# Patient Record
Sex: MALE | Race: WHITE
[De-identification: names, ages, dates, MRNs, and addresses within clinical notes are randomized per-mention and may not be internally consistent; named-entity substitution may affect disease eponyms.]

---

## 2021-05-20 ENCOUNTER — HOSPITAL ENCOUNTER (INPATIENT)
Dept: HOSPITAL 83 - ED | Age: 36
LOS: 1 days | Discharge: NURSING, REHAB OR LONG-TERM CARE | DRG: 720 | End: 2021-05-21
Attending: INTERNAL MEDICINE | Admitting: INTERNAL MEDICINE
Payer: COMMERCIAL

## 2021-05-20 VITALS — SYSTOLIC BLOOD PRESSURE: 115 MMHG | DIASTOLIC BLOOD PRESSURE: 71 MMHG

## 2021-05-20 VITALS — DIASTOLIC BLOOD PRESSURE: 74 MMHG

## 2021-05-20 VITALS — DIASTOLIC BLOOD PRESSURE: 68 MMHG

## 2021-05-20 VITALS — BODY MASS INDEX: 25.71 KG/M2 | WEIGHT: 194 LBS | HEIGHT: 72.99 IN

## 2021-05-20 DIAGNOSIS — F10.10: ICD-10-CM

## 2021-05-20 DIAGNOSIS — M10.9: ICD-10-CM

## 2021-05-20 DIAGNOSIS — F14.10: ICD-10-CM

## 2021-05-20 DIAGNOSIS — Z71.6: ICD-10-CM

## 2021-05-20 DIAGNOSIS — Z79.899: ICD-10-CM

## 2021-05-20 DIAGNOSIS — L03.116: ICD-10-CM

## 2021-05-20 DIAGNOSIS — Z82.3: ICD-10-CM

## 2021-05-20 DIAGNOSIS — A41.9: Primary | ICD-10-CM

## 2021-05-20 DIAGNOSIS — R73.9: ICD-10-CM

## 2021-05-20 DIAGNOSIS — F17.210: ICD-10-CM

## 2021-05-20 DIAGNOSIS — E44.0: ICD-10-CM

## 2021-05-20 LAB
ALBUMIN SERPL-MCNC: 3.3 GM/DL (ref 3.1–4.5)
ALP SERPL-CCNC: 76 U/L (ref 45–117)
ALT SERPL W P-5'-P-CCNC: 24 U/L (ref 12–78)
AMPHETAMINES UR QL SCN: < 1000
AST SERPL-CCNC: 10 IU/L (ref 3–35)
BARBITURATES UR QL SCN: < 200
BASOPHILS # BLD AUTO: 0.1 10*3/UL (ref 0–0.1)
BASOPHILS NFR BLD AUTO: 0.5 % (ref 0–1)
BENZODIAZ UR QL SCN: < 200
BUN SERPL-MCNC: 13 MG/DL (ref 7–24)
BZE UR QL SCN: > 300
CANNABINOIDS UR QL SCN: < 50
CHLORIDE SERPL-SCNC: 99 MMOL/L (ref 98–107)
CREAT SERPL-MCNC: 1.3 MG/DL (ref 0.7–1.3)
EOSINOPHIL # BLD AUTO: 1.5 10*3/UL (ref 0–0.4)
EOSINOPHIL # BLD AUTO: 12.4 % (ref 1–4)
ERYTHROCYTE [DISTWIDTH] IN BLOOD BY AUTOMATED COUNT: 12 % (ref 0–14.5)
HCT VFR BLD AUTO: 41.4 % (ref 42–52)
LYMPHOCYTES # BLD AUTO: 3 10*3/UL (ref 1.3–4.4)
LYMPHOCYTES NFR BLD AUTO: 24.2 % (ref 27–41)
MCH RBC QN AUTO: 31.7 PG (ref 27–31)
MCHC RBC AUTO-ENTMCNC: 34.3 G/DL (ref 33–37)
MCV RBC AUTO: 92.4 FL (ref 80–94)
METHADONE UR QL SCN: < 300
MONOCYTES # BLD AUTO: 1.1 10*3/UL (ref 0.1–1)
MONOCYTES NFR BLD MANUAL: 8.8 % (ref 3–9)
NEUT #: 6.7 10*3/UL (ref 2.3–7.9)
NEUT %: 53.9 % (ref 47–73)
NRBC BLD QL AUTO: 0 % (ref 0–0)
OPIATES UR QL SCN: > 300
PCP UR QL SCN: <  25
PLATELET # BLD AUTO: 195 10*3/UL (ref 130–400)
PMV BLD AUTO: 9.7 FL (ref 9.6–12.3)
POTASSIUM SERPL-SCNC: 3.6 MMOL/L (ref 3.5–5.1)
PROT SERPL-MCNC: 7.1 GM/DL (ref 6.4–8.2)
RBC # BLD AUTO: 4.48 10*6/UL (ref 4.5–5.9)
SODIUM SERPL-SCNC: 135 MMOL/L (ref 136–145)
URATE SERPL-MCNC: 6.1 MG/DL (ref 3.5–7.2)
WBC NRBC COR # BLD AUTO: 12.4 10*3/UL (ref 4.8–10.8)

## 2021-05-21 VITALS — DIASTOLIC BLOOD PRESSURE: 77 MMHG

## 2021-05-21 LAB
BASOPHILS # BLD AUTO: 0 10*3/UL (ref 0–0.1)
BASOPHILS NFR BLD AUTO: 0.4 % (ref 0–1)
BUN SERPL-MCNC: 13 MG/DL (ref 7–24)
CHLORIDE SERPL-SCNC: 105 MMOL/L (ref 98–107)
CREAT SERPL-MCNC: 1.12 MG/DL (ref 0.7–1.3)
EOSINOPHIL # BLD AUTO: 1.5 10*3/UL (ref 0–0.4)
EOSINOPHIL # BLD AUTO: 16.6 % (ref 1–4)
ERYTHROCYTE [DISTWIDTH] IN BLOOD BY AUTOMATED COUNT: 11.9 % (ref 0–14.5)
HCT VFR BLD AUTO: 40.3 % (ref 42–52)
LYMPHOCYTES # BLD AUTO: 2.6 10*3/UL (ref 1.3–4.4)
LYMPHOCYTES NFR BLD AUTO: 29.4 % (ref 27–41)
MCH RBC QN AUTO: 31.5 PG (ref 27–31)
MCHC RBC AUTO-ENTMCNC: 34 G/DL (ref 33–37)
MCV RBC AUTO: 92.6 FL (ref 80–94)
MONOCYTES # BLD AUTO: 0.8 10*3/UL (ref 0.1–1)
MONOCYTES NFR BLD MANUAL: 9.3 % (ref 3–9)
NEUT #: 3.9 10*3/UL (ref 2.3–7.9)
NEUT %: 44.1 % (ref 47–73)
NRBC BLD QL AUTO: 0 % (ref 0–0)
PLATELET # BLD AUTO: 192 10*3/UL (ref 130–400)
PMV BLD AUTO: 10.1 FL (ref 9.6–12.3)
POTASSIUM SERPL-SCNC: 4 MMOL/L (ref 3.5–5.1)
RBC # BLD AUTO: 4.35 10*6/UL (ref 4.5–5.9)
SODIUM SERPL-SCNC: 139 MMOL/L (ref 136–145)
WBC NRBC COR # BLD AUTO: 9 10*3/UL (ref 4.8–10.8)

## 2021-12-01 ENCOUNTER — PREP FOR PROCEDURE (OUTPATIENT)
Dept: SURGERY | Age: 36
End: 2021-12-01

## 2021-12-01 RX ORDER — SODIUM CHLORIDE 0.9 % (FLUSH) 0.9 %
5-40 SYRINGE (ML) INJECTION EVERY 12 HOURS SCHEDULED
Status: CANCELLED | OUTPATIENT
Start: 2021-12-01

## 2021-12-01 RX ORDER — SODIUM CHLORIDE 0.9 % (FLUSH) 0.9 %
5-40 SYRINGE (ML) INJECTION PRN
Status: CANCELLED | OUTPATIENT
Start: 2021-12-01

## 2021-12-01 RX ORDER — SODIUM CHLORIDE, SODIUM LACTATE, POTASSIUM CHLORIDE, CALCIUM CHLORIDE 600; 310; 30; 20 MG/100ML; MG/100ML; MG/100ML; MG/100ML
INJECTION, SOLUTION INTRAVENOUS CONTINUOUS
Status: CANCELLED | OUTPATIENT
Start: 2021-12-01

## 2022-01-10 ENCOUNTER — PREP FOR PROCEDURE (OUTPATIENT)
Dept: SURGERY | Age: 37
End: 2022-01-10

## 2022-01-10 RX ORDER — SODIUM CHLORIDE 0.9 % (FLUSH) 0.9 %
5-40 SYRINGE (ML) INJECTION EVERY 12 HOURS SCHEDULED
Status: CANCELLED | OUTPATIENT
Start: 2022-01-10

## 2022-01-10 RX ORDER — SODIUM CHLORIDE 0.9 % (FLUSH) 0.9 %
5-40 SYRINGE (ML) INJECTION PRN
Status: CANCELLED | OUTPATIENT
Start: 2022-01-10

## 2022-01-10 RX ORDER — SODIUM CHLORIDE, SODIUM LACTATE, POTASSIUM CHLORIDE, CALCIUM CHLORIDE 600; 310; 30; 20 MG/100ML; MG/100ML; MG/100ML; MG/100ML
INJECTION, SOLUTION INTRAVENOUS CONTINUOUS
Status: CANCELLED | OUTPATIENT
Start: 2022-01-10

## 2022-02-24 ENCOUNTER — PREP FOR PROCEDURE (OUTPATIENT)
Dept: SURGERY | Age: 37
End: 2022-02-24

## 2022-02-24 RX ORDER — SODIUM CHLORIDE 0.9 % (FLUSH) 0.9 %
5-40 SYRINGE (ML) INJECTION EVERY 12 HOURS SCHEDULED
Status: CANCELLED | OUTPATIENT
Start: 2022-02-24

## 2022-02-24 RX ORDER — SODIUM CHLORIDE, SODIUM LACTATE, POTASSIUM CHLORIDE, CALCIUM CHLORIDE 600; 310; 30; 20 MG/100ML; MG/100ML; MG/100ML; MG/100ML
INJECTION, SOLUTION INTRAVENOUS CONTINUOUS
Status: CANCELLED | OUTPATIENT
Start: 2022-02-24

## 2022-02-24 RX ORDER — SODIUM CHLORIDE 0.9 % (FLUSH) 0.9 %
5-40 SYRINGE (ML) INJECTION PRN
Status: CANCELLED | OUTPATIENT
Start: 2022-02-24

## 2022-03-15 RX ORDER — ALLOPURINOL 100 MG/1
100 TABLET ORAL NIGHTLY
COMMUNITY

## 2022-03-15 NOTE — PROGRESS NOTES
[facility] PRE-ADMISSION TESTING INSTRUCTIONS    The Preadmission Testing patient is instructed accordingly using the following criteria (check applicable):    ARRIVAL INSTRUCTIONS:  [x] Parking the day of Surgery is located in the Main Entrance lot. Upon entering the door, make an immediate right to the surgery reception desk    [x] Bring photo ID and insurance card    [] Bring in a copy of Living will or Durable Power of  papers. [x] Please be sure to arrange for responsible adult to provide transportation to and from the hospital    [x] Please arrange for responsible adult to be with you for the 24 hour period post procedure due to having anesthesia      GENERAL INSTRUCTIONS:    [x] Nothing by mouth after midnight, including gum, candy, mints or water    [x] You may brush your teeth, but do not swallow any water    [] Take medications as instructed with 1-2 oz of water    [] Stop herbal supplements and vitamins 5 days prior to procedure    [] Follow preop dosing of blood thinners per physician instructions    [] Take 1/2 dose of evening insulin, but no insulin after midnight    [] No oral diabetic medications after midnight    [] If diabetic and have low blood sugar or feel symptomatic, take 1-2oz apple juice only    [x] Bring inhalers day of surgery    [] Bring C-PAP/ Bi-Pap day of surgery    [] Bring urine specimen day of surgery    [x] Shower or bath with soap, lather and rinse well, AM of Surgery, no lotion, powders or creams to surgical site    [] Follow bowel prep as instructed per surgeon    [x] No tobacco products within 24 hours of surgery     [x] No alcohol or illegal drug use within 24 hours of surgery.     [x] Jewelry, body piercing's, eyeglasses, contact lenses and dentures are not permitted into surgery (bring cases)      [] Please do not wear any nail polish, make up or hair products on the day of surgery    [x] You can expect a call the business day prior to procedure to notify you if your arrival time changes    [x] If you receive a survey after surgery we would greatly appreciate your comments    [] Parent/guardian of a minor must accompany their child and remain on the premises  the entire time they are under our care     [] Pediatric patients may bring favorite toy, blanket or comfort item with them    [] A caregiver or family member must remain with the patient during their stay if they are mentally handicapped, have dementia, disoriented or unable to use a call light or would be a safety concern if left unattended    [x] Please notify surgeon if you develop any illness between now and time of surgery (cold, cough, sore throat, fever, nausea, vomiting) or any signs of infections  including skin, wounds, and dental.    [x]  The Outpatient Pharmacy is available to fill your prescription here on your day of surgery, ask your preop nurse for details    [] Other instructions    EDUCATIONAL MATERIALS PROVIDED:    [] PAT Preoperative Education Packet/Booklet     [] Medication List    [] Transfusion bracelet applied with instructions    [] Shower with soap, lather and rinse well, and use CHG wipes provided the evening before surgery as instructed    [] Incentive spirometer with instructions

## 2022-03-15 NOTE — PROGRESS NOTES
Have you been tested for COVID  Yes           Have you been told you were positive for COVID No  Have you had any known exposure to someone that is positive for COVID No  Do you have a cough                   No              Do you have shortness of breath No                 Do you have a sore throat            No                Are you having chills                    No                Are you having muscle aches. No                    Please come to the hospital wearing a mask and have your significant other wear a mask as well. Both of you should check your temperature before leaving to come here,  if it is 100 or higher please call [phone] for instruction.

## 2022-03-15 NOTE — PROGRESS NOTES
Spoke with pt. He never went and had covid test done when he cancelled procedure on 2/25/2022 due to symptoms of loss of taste/smell and sore throat. States he is currently asymptomatic.  Reviewed with vitor De Leon to proceed no needs identified

## 2022-03-17 ENCOUNTER — ANESTHESIA EVENT (OUTPATIENT)
Dept: OPERATING ROOM | Age: 37
End: 2022-03-17
Payer: COMMERCIAL

## 2022-03-17 ENCOUNTER — PREP FOR PROCEDURE (OUTPATIENT)
Dept: SURGERY | Age: 37
End: 2022-03-17

## 2022-03-17 RX ORDER — SODIUM CHLORIDE 0.9 % (FLUSH) 0.9 %
5-40 SYRINGE (ML) INJECTION EVERY 12 HOURS SCHEDULED
Status: CANCELLED | OUTPATIENT
Start: 2022-03-17

## 2022-03-17 RX ORDER — SODIUM CHLORIDE, SODIUM LACTATE, POTASSIUM CHLORIDE, CALCIUM CHLORIDE 600; 310; 30; 20 MG/100ML; MG/100ML; MG/100ML; MG/100ML
INJECTION, SOLUTION INTRAVENOUS CONTINUOUS
Status: CANCELLED | OUTPATIENT
Start: 2022-03-17

## 2022-03-17 RX ORDER — SODIUM CHLORIDE 0.9 % (FLUSH) 0.9 %
5-40 SYRINGE (ML) INJECTION PRN
Status: CANCELLED | OUTPATIENT
Start: 2022-03-17

## 2022-03-17 NOTE — H&P
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 The patient states that the area has been present for many years. Recently, the area has increased in size. The patient denies any significant associated symptomatology. 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 FH:  Father:  . Mother:  Alive. Reviewed and updated. SH:  Personal Habits:  Tobacco Use: Patient is a current smoker, smokes every day. Alcohol: Denies alcohol use. Drug Use: Denies Drug Use. Daily Caffeine: Consumes on average 1 cup of regular coffee per day. Reviewed and updated. Date: 2021  Was the patient queried about smoking behavior? Yes  No  Does the patient currently smoke? Tobacco Use: Patient is a current smoker, smokes every day. ROS:  Const: Denies anorexia, anxiety, fatigue, night sweats, weight gain and weight loss. Eyes: Denies eye symptoms. ENMT: Denies ear symptoms. Denies nasal symptoms. Denies mouth or throat symptoms. CV: Denies hypertension and other cardiovascular symptoms. Resp: Denies respiratory symptoms. GI: Denies hepatitis, liver disease and other gastrointestinal symptoms. Musculo: Denies musculoskeletal symptoms. Skin: Denies skin, hair and nail symptoms. Breast: Denies breast problems. Neuro: Denies neurologic symptoms. Psych: Denies depression and substance abuse. Endocrine: Denies diabetes, kidney disease and thyroid disease. Hema/Lymph: Denies anemia, blood disease, cancer and past transfusion. Allergy/Immuno: Denies immunosuppression. Reviewed and updated.     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 I have informed the patient of the risks, benefits and complications of the procedure and he is waiting to proceed. Moderate complexity    I performed an updated history, physical examination, assessment and plan.              Seen by:

## 2022-03-17 NOTE — H&P (VIEW-ONLY)
29.0    Exam:    Neck is supple without adenopathy  Back with a 14 cm connective tissue neoplasm which is mobile and soft overlying the upper left trapezius  Lungs are clear to auscultation  Cardiac regular rate and rhythm without murmurs rubs or gallops  Abdomen soft nondistended and nontender         Assessment #1: Hx O41.3 Neoplasm of uncertain behavior of connective and other soft tissue   Care Plan:              Comments       : The patient will undergo excision of the connective tissue neoplasm. I have informed the patient of the risks, benefits and complications of the procedure and he is waiting to proceed. Moderate complexity    I performed an updated history, physical examination, assessment and plan.              Seen by:

## 2022-03-18 ENCOUNTER — ANESTHESIA (OUTPATIENT)
Dept: OPERATING ROOM | Age: 37
End: 2022-03-18
Payer: COMMERCIAL

## 2022-03-18 ENCOUNTER — HOSPITAL ENCOUNTER (OUTPATIENT)
Age: 37
Setting detail: OUTPATIENT SURGERY
Discharge: HOME | End: 2022-03-18
Attending: SURGERY | Admitting: SURGERY
Payer: COMMERCIAL

## 2022-03-18 VITALS — OXYGEN SATURATION: 99 % | SYSTOLIC BLOOD PRESSURE: 79 MMHG | DIASTOLIC BLOOD PRESSURE: 40 MMHG

## 2022-03-18 VITALS
TEMPERATURE: 97.3 F | HEIGHT: 72 IN | RESPIRATION RATE: 16 BRPM | SYSTOLIC BLOOD PRESSURE: 125 MMHG | BODY MASS INDEX: 27.77 KG/M2 | DIASTOLIC BLOOD PRESSURE: 78 MMHG | WEIGHT: 205 LBS | OXYGEN SATURATION: 98 % | HEART RATE: 66 BPM

## 2022-03-18 DIAGNOSIS — Z98.890 S/P EXCISION OF LIPOMA: Primary | ICD-10-CM

## 2022-03-18 DIAGNOSIS — Z86.018 S/P EXCISION OF LIPOMA: Primary | ICD-10-CM

## 2022-03-18 PROCEDURE — 2580000003 HC RX 258: Performed by: ANESTHESIOLOGIST ASSISTANT

## 2022-03-18 PROCEDURE — 3600000012 HC SURGERY LEVEL 2 ADDTL 15MIN: Performed by: SURGERY

## 2022-03-18 PROCEDURE — 88304 TISSUE EXAM BY PATHOLOGIST: CPT

## 2022-03-18 PROCEDURE — 2709999900 HC NON-CHARGEABLE SUPPLY: Performed by: SURGERY

## 2022-03-18 PROCEDURE — 3700000000 HC ANESTHESIA ATTENDED CARE: Performed by: SURGERY

## 2022-03-18 PROCEDURE — 6370000000 HC RX 637 (ALT 250 FOR IP): Performed by: ANESTHESIOLOGIST ASSISTANT

## 2022-03-18 PROCEDURE — 7100000010 HC PHASE II RECOVERY - FIRST 15 MIN: Performed by: SURGERY

## 2022-03-18 PROCEDURE — 7100000011 HC PHASE II RECOVERY - ADDTL 15 MIN: Performed by: SURGERY

## 2022-03-18 PROCEDURE — 3700000001 HC ADD 15 MINUTES (ANESTHESIA): Performed by: SURGERY

## 2022-03-18 PROCEDURE — 3600000002 HC SURGERY LEVEL 2 BASE: Performed by: SURGERY

## 2022-03-18 PROCEDURE — 2500000003 HC RX 250 WO HCPCS: Performed by: SURGERY

## 2022-03-18 PROCEDURE — 6360000002 HC RX W HCPCS: Performed by: SURGERY

## 2022-03-18 PROCEDURE — 6360000002 HC RX W HCPCS: Performed by: ANESTHESIOLOGIST ASSISTANT

## 2022-03-18 PROCEDURE — 2500000003 HC RX 250 WO HCPCS: Performed by: ANESTHESIOLOGIST ASSISTANT

## 2022-03-18 RX ORDER — SODIUM CHLORIDE 9 MG/ML
INJECTION, SOLUTION INTRAVENOUS CONTINUOUS PRN
Status: DISCONTINUED | OUTPATIENT
Start: 2022-03-18 | End: 2022-03-18 | Stop reason: SDUPTHER

## 2022-03-18 RX ORDER — TRAMADOL HYDROCHLORIDE 50 MG/1
50 TABLET ORAL ONCE
Status: DISCONTINUED | OUTPATIENT
Start: 2022-03-18 | End: 2022-03-18 | Stop reason: HOSPADM

## 2022-03-18 RX ORDER — LIDOCAINE HYDROCHLORIDE 10 MG/ML
INJECTION, SOLUTION INFILTRATION; PERINEURAL PRN
Status: DISCONTINUED | OUTPATIENT
Start: 2022-03-18 | End: 2022-03-18 | Stop reason: ALTCHOICE

## 2022-03-18 RX ORDER — ALBUTEROL SULFATE 90 UG/1
AEROSOL, METERED RESPIRATORY (INHALATION) PRN
Status: DISCONTINUED | OUTPATIENT
Start: 2022-03-18 | End: 2022-03-18 | Stop reason: SDUPTHER

## 2022-03-18 RX ORDER — SODIUM CHLORIDE 0.9 % (FLUSH) 0.9 %
5-40 SYRINGE (ML) INJECTION EVERY 12 HOURS SCHEDULED
Status: DISCONTINUED | OUTPATIENT
Start: 2022-03-18 | End: 2022-03-18 | Stop reason: HOSPADM

## 2022-03-18 RX ORDER — FENTANYL CITRATE 50 UG/ML
INJECTION, SOLUTION INTRAMUSCULAR; INTRAVENOUS PRN
Status: DISCONTINUED | OUTPATIENT
Start: 2022-03-18 | End: 2022-03-18 | Stop reason: SDUPTHER

## 2022-03-18 RX ORDER — SODIUM CHLORIDE 0.9 % (FLUSH) 0.9 %
5-40 SYRINGE (ML) INJECTION PRN
Status: DISCONTINUED | OUTPATIENT
Start: 2022-03-18 | End: 2022-03-18 | Stop reason: HOSPADM

## 2022-03-18 RX ORDER — PROPOFOL 10 MG/ML
INJECTION, EMULSION INTRAVENOUS CONTINUOUS PRN
Status: DISCONTINUED | OUTPATIENT
Start: 2022-03-18 | End: 2022-03-18 | Stop reason: SDUPTHER

## 2022-03-18 RX ORDER — GLYCOPYRROLATE 1 MG/5 ML
SYRINGE (ML) INTRAVENOUS PRN
Status: DISCONTINUED | OUTPATIENT
Start: 2022-03-18 | End: 2022-03-18 | Stop reason: SDUPTHER

## 2022-03-18 RX ORDER — KETAMINE HYDROCHLORIDE 10 MG/ML
INJECTION, SOLUTION INTRAMUSCULAR; INTRAVENOUS PRN
Status: DISCONTINUED | OUTPATIENT
Start: 2022-03-18 | End: 2022-03-18 | Stop reason: SDUPTHER

## 2022-03-18 RX ORDER — SODIUM CHLORIDE, SODIUM LACTATE, POTASSIUM CHLORIDE, CALCIUM CHLORIDE 600; 310; 30; 20 MG/100ML; MG/100ML; MG/100ML; MG/100ML
INJECTION, SOLUTION INTRAVENOUS CONTINUOUS
Status: DISCONTINUED | OUTPATIENT
Start: 2022-03-18 | End: 2022-03-18 | Stop reason: HOSPADM

## 2022-03-18 RX ORDER — TRAMADOL HYDROCHLORIDE 50 MG/1
50 TABLET ORAL EVERY 6 HOURS PRN
Qty: 20 TABLET | Refills: 0 | Status: SHIPPED | OUTPATIENT
Start: 2022-03-18 | End: 2022-03-23

## 2022-03-18 RX ORDER — MIDAZOLAM HYDROCHLORIDE 1 MG/ML
INJECTION INTRAMUSCULAR; INTRAVENOUS PRN
Status: DISCONTINUED | OUTPATIENT
Start: 2022-03-18 | End: 2022-03-18 | Stop reason: SDUPTHER

## 2022-03-18 RX ADMIN — PROPOFOL 150 MCG/KG/MIN: 10 INJECTION, EMULSION INTRAVENOUS at 10:00

## 2022-03-18 RX ADMIN — FENTANYL CITRATE 25 MCG: 50 INJECTION, SOLUTION INTRAMUSCULAR; INTRAVENOUS at 10:10

## 2022-03-18 RX ADMIN — Medication 0.2 MG: at 10:03

## 2022-03-18 RX ADMIN — SODIUM CHLORIDE: 9 INJECTION, SOLUTION INTRAVENOUS at 09:50

## 2022-03-18 RX ADMIN — Medication 2000 MG: at 10:03

## 2022-03-18 RX ADMIN — FENTANYL CITRATE 100 MCG: 50 INJECTION, SOLUTION INTRAMUSCULAR; INTRAVENOUS at 10:47

## 2022-03-18 RX ADMIN — KETAMINE HYDROCHLORIDE 35 MG: 10 INJECTION INTRAMUSCULAR; INTRAVENOUS at 10:03

## 2022-03-18 RX ADMIN — MIDAZOLAM 2 MG: 1 INJECTION INTRAMUSCULAR; INTRAVENOUS at 09:53

## 2022-03-18 RX ADMIN — FENTANYL CITRATE 50 MCG: 50 INJECTION, SOLUTION INTRAMUSCULAR; INTRAVENOUS at 09:55

## 2022-03-18 RX ADMIN — KETAMINE HYDROCHLORIDE 5 MG: 10 INJECTION INTRAMUSCULAR; INTRAVENOUS at 10:18

## 2022-03-18 RX ADMIN — ALBUTEROL SULFATE 2 PUFF: 90 AEROSOL, METERED RESPIRATORY (INHALATION) at 09:59

## 2022-03-18 RX ADMIN — FENTANYL CITRATE 25 MCG: 50 INJECTION, SOLUTION INTRAMUSCULAR; INTRAVENOUS at 10:03

## 2022-03-18 ASSESSMENT — PULMONARY FUNCTION TESTS
PIF_VALUE: 1
PIF_VALUE: 0
PIF_VALUE: 1
PIF_VALUE: 0
PIF_VALUE: 1
PIF_VALUE: 1
PIF_VALUE: 0
PIF_VALUE: 1
PIF_VALUE: 0
PIF_VALUE: 1
PIF_VALUE: 0
PIF_VALUE: 1
PIF_VALUE: 0
PIF_VALUE: 0
PIF_VALUE: 1
PIF_VALUE: 1
PIF_VALUE: 0
PIF_VALUE: 0
PIF_VALUE: 1
PIF_VALUE: 0
PIF_VALUE: 1
PIF_VALUE: 0
PIF_VALUE: 1
PIF_VALUE: 0
PIF_VALUE: 0
PIF_VALUE: 1
PIF_VALUE: 0
PIF_VALUE: 1
PIF_VALUE: 0
PIF_VALUE: 1
PIF_VALUE: 0
PIF_VALUE: 1
PIF_VALUE: 0
PIF_VALUE: 0
PIF_VALUE: 1

## 2022-03-18 ASSESSMENT — LIFESTYLE VARIABLES: SMOKING_STATUS: 1

## 2022-03-18 ASSESSMENT — PAIN DESCRIPTION - DESCRIPTORS: DESCRIPTORS: SORE

## 2022-03-18 ASSESSMENT — PAIN - FUNCTIONAL ASSESSMENT: PAIN_FUNCTIONAL_ASSESSMENT: 0-10

## 2022-03-18 NOTE — ANESTHESIA POSTPROCEDURE EVALUATION
Department of Anesthesiology  Postprocedure Note    Patient: Rodrick Parks  MRN: [id number]  YOB: 1985  Date of evaluation: 3/18/2022  Time:  12:56 PM     Procedure Summary     Date: 03/18/22 Room / Location: [facility] 01 / 106 [facility]    Anesthesia Start: 2019 Anesthesia Stop: 1059    Procedure: EXCISION UPPER BACK MASS (N/A Back) Diagnosis: (UPPER BACK MASS)    Surgeons: Caroline Yanez MD Responsible Provider: Arabella Palacios MD    Anesthesia Type: MAC ASA Status: 2          Anesthesia Type: MAC    Dejon Phase I: Dejon Score: 10    Dejon Phase II:      Last vitals: Reviewed and per EMR flowsheets.        Anesthesia Post Evaluation    Patient location during evaluation: PACU  Patient participation: complete - patient participated  Level of consciousness: awake and alert  Airway patency: patent  Nausea & Vomiting: no nausea and no vomiting  Complications: no  Cardiovascular status: hemodynamically stable  Respiratory status: acceptable  Hydration status: euvolemic

## 2022-03-18 NOTE — INTERVAL H&P NOTE
Update History & Physical    The patient's History and Physical of March 18, 2022 was reviewed with the patient and I examined the patient. There was no change. The surgical site was confirmed by the patient and me. Plan: The risks, benefits, expected outcome, and alternative to the recommended procedure have been discussed with the patient. Patient understands and wants to proceed with the procedure.      Electronically signed by Pawan Cordoba MD on 3/18/2022 at 9:43 AM Lucía

## 2022-03-18 NOTE — ANESTHESIA PRE PROCEDURE
Department of Anesthesiology  Preprocedure Note       Name:  Shirley Landry   Age:  40 y.o.  :  1985                                          MRN:  [id number]         Date:  3/18/2022      Surgeon: Donnie Hawley):  Michele Rodriguez MD    Procedure: Procedure(s):  EXCISION UPPER BACK MASS    Medications prior to admission:   Prior to Admission medications    Medication Sig Start Date End Date Taking? Authorizing Provider   allopurinol (ZYLOPRIM) 100 MG tablet Take 100 mg by mouth nightly   Yes Historical Provider, MD   albuterol sulfate HFA (VENTOLIN HFA) 108 (90 Base) MCG/ACT inhaler Inhale 2 puffs into the lungs 2 times daily    Historical Provider, MD   QUEtiapine Fumarate (SEROQUEL PO) Take 50 mg by mouth nightly    Historical Provider, MD       Current medications:    Current Facility-Administered Medications   Medication Dose Route Frequency Provider Last Rate Last Admin    ceFAZolin (ANCEF) 2000 mg in sterile water 20 mL IV syringe  2,000 mg IntraVENous On Call to [facility] Benny Richmond MD        lactated ringers infusion   IntraVENous Continuous Michele Rodriguez MD        sodium chloride flush 0.9 % injection 5-40 mL  5-40 mL IntraVENous 2 times per day Michele Rodriguez MD        sodium chloride flush 0.9 % injection 5-40 mL  5-40 mL IntraVENous PRN Michele Rodriguez MD           Allergies:  No Known Allergies    Problem List:  There is no problem list on file for this patient. Past Medical History:        Diagnosis Date    Gout     Mass on back        Past Surgical History:  History reviewed. No pertinent surgical history.     Social History:    Social History     Tobacco Use    Smoking status: Current Every Day Smoker     Packs/day: 1.50     Years: 14.00     Pack years: 21.00     Types: Cigarettes    Smokeless tobacco: Never Used   Substance Use Topics    Alcohol use: Never                                Ready to quit: Not Answered  Counseling given: Not Answered      Vital Signs (Current):   Vitals: 03/15/22 1123 03/18/22 0819   BP:  125/69   Pulse:  62   Resp:  14   Temp:  97.9 °F (36.6 °C)   TempSrc:  Temporal   SpO2:  96%   Weight: 205 lb (93 kg) 205 lb (93 kg)   Height: 6' (1.829 m) 6' (1.829 m)                                              BP Readings from Last 3 Encounters:   03/18/22 125/69       NPO Status: Time of last liquid consumption: 2200                        Time of last solid consumption: 2100                        Date of last liquid consumption: 03/17/22                        Date of last solid food consumption: 03/17/22    BMI:   Wt Readings from Last 3 Encounters:   03/18/22 205 lb (93 kg)     Body mass index is 27.8 kg/m². CBC: No results found for: WBC, RBC, HGB, HCT, MCV, RDW, PLT    CMP: No results found for: NA, K, CL, CO2, BUN, CREATININE, GFRAA, AGRATIO, LABGLOM, GLUCOSE, GLU, PROT, CALCIUM, BILITOT, ALKPHOS, AST, ALT    POC Tests: No results for input(s): POCGLU, POCNA, POCK, POCCL, POCBUN, POCHEMO, POCHCT in the last 72 hours. Coags: No results found for: PROTIME, INR, APTT    HCG (If Applicable): No results found for: PREGTESTUR, PREGSERUM, HCG, HCGQUANT     ABGs: No results found for: PHART, PO2ART, JHD7WBH, NOY7HCP, BEART, S9ZBHKQI     Type & Screen (If Applicable):  No results found for: LABABO, LABRH    Drug/Infectious Status (If Applicable):  No results found for: HIV, HEPCAB    COVID-19 Screening (If Applicable): No results found for: COVID19        Anesthesia Evaluation  Patient summary reviewed no history of anesthetic complications:   Airway: Mallampati: II  TM distance: >3 FB   Neck ROM: full  Mouth opening: > = 3 FB Dental: normal exam         Pulmonary: breath sounds clear to auscultation  (+) current smoker          Patient did not smoke on day of surgery.                  Cardiovascular:Negative CV ROS          ECG reviewed  Rhythm: regular  Rate: normal                    Neuro/Psych:   Negative Neuro/Psych ROS              GI/Hepatic/Renal: Neg GI/Hepatic/Renal ROS            Endo/Other:                      ROS comment: UPPER BACK MASS Abdominal:             Vascular: negative vascular ROS. Other Findings:             Anesthesia Plan      MAC     ASA 2       Induction: intravenous. Anesthetic plan and risks discussed with patient. Plan discussed with CRNA.                   Marquez Galvez MD   3/18/2022

## 2022-03-18 NOTE — OP NOTE
Operative Note      Patient: Marilynn Galeazzi  YOB: 1985  MRN: [id number]    Date of Procedure: 3/18/2022    Pre-Op Diagnosis: UPPER BACK MASS    Post-Op Diagnosis: Same       Procedure(s):  EXCISION UPPER BACK MASS    Surgeon(s):  Olya Gonzalez MD    Assistant:   Resident: Vertell Hatchet, DO    Anesthesia: Monitor Anesthesia Care    Estimated Blood Loss (mL): less than 50     Complications: None    Specimens:   ID Type Source Tests Collected by Time Destination   A : LEFT BACK CONNECTIVE TISSUE NEOPLASM Tissue Tissue SURGICAL PATHOLOGY Olya Gonzalez MD 3/18/2022 1015        Implants:  * No implants in log *      Drains: * No LDAs found *    Findings: subfascial lipomatous mass measuring 7cm x 5cm x 2.5cm    Detailed Description of Procedure:       Procedure: The patient was then taken to the operating room and positioned on the operating table in the right lateral decubitus position. Pneumatic compression devices were placed on the lower extremities bilaterally. The patient was placed under [facility] ATHENS and administered 2 grams of Ancef intravenously on induction. The upper back and surrounding areas were prepped with Duraprep and draped in the usual sterile fashion. A transverse  incision  with a  #15-blade scalpel was made overlying the palpable mass. Sharp dissection with electrocautery was used to dissect anterior, superior, medial, lateral and inferior margins of the lesion. The dissection was carried down below the level of the fascia into the musculature. A lipomatous mass was removed and measured to be 7cm x 5cm x 2.5cm. Meticulous hemostasis was achieved at the surgical site using careful electrocautery. The surgical site was irrigated with sterile saline. Hemostasis was again confirmed using electrocautery. There was no further bleeding. Deep dermal sutures using 3-0 Vicryl were then placed to close the deep space and to reapproximate the skin.  A 4-0 vicryl was then used to close the skin in a running fashion. The skin was cleaned and dried and covered with skin glue      The patient tolerated the procedure well. There were no complications. The patient will be observed in the postanesthesia care unit and then discharged home when criteria are met.      Dr. Erica Garza was present and scrubbed in for the entire procedure    Electronically signed by Michelle Lee DO on 3/18/2022 at 10:50 AM

## 2022-03-18 NOTE — BRIEF OP NOTE
Brief Postoperative Note      Patient: Eric Escobar  YOB: 1985  MRN: [id number]    Date of Procedure: 3/18/2022    Pre-Op Diagnosis: UPPER BACK MASS    Post-Op Diagnosis: Same       Procedure(s):  EXCISION UPPER BACK MASS    Surgeon(s):  Tanner Davila MD    Assistant:  Resident: Sara Hunt DO    Anesthesia: Monitor Anesthesia Care    Estimated Blood Loss (mL): Minimal    Complications: None    Specimens:   ID Type Source Tests Collected by Time Destination   A : LEFT BACK CONNECTIVE TISSUE NEOPLASM Tissue Tissue SURGICAL PATHOLOGY Tanner Davila MD 3/18/2022 1015        Implants:  * No implants in log *      Drains: * No LDAs found *    Findings: subfascial lipoma    Electronically signed by Sara Hunt DO on 3/18/2022 at 10:50 AM

## 2022-03-18 NOTE — PROGRESS NOTES
Discharge instructions provided to patient and mother, both verbalize understanding  Patient discharged home with paper script for [facility]

## 2023-05-09 ENCOUNTER — APPOINTMENT (OUTPATIENT)
Dept: GENERAL RADIOLOGY | Age: 38
DRG: 314 | End: 2023-05-09
Payer: COMMERCIAL

## 2023-05-09 ENCOUNTER — APPOINTMENT (OUTPATIENT)
Dept: CT IMAGING | Age: 38
DRG: 314 | End: 2023-05-09
Payer: COMMERCIAL

## 2023-05-09 ENCOUNTER — HOSPITAL ENCOUNTER (INPATIENT)
Age: 38
LOS: 3 days | Discharge: HOME | DRG: 314 | End: 2023-05-12
Attending: EMERGENCY MEDICINE | Admitting: ORTHOPAEDIC SURGERY
Payer: COMMERCIAL

## 2023-05-09 DIAGNOSIS — T14.90XA TRAUMA: Primary | ICD-10-CM
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
## 2023-05-09 DIAGNOSIS — M79.671 RIGHT FOOT PAIN: ICD-10-CM

## 2023-05-09 DIAGNOSIS — S93.324A LISFRANC DISLOCATION, RIGHT, INITIAL ENCOUNTER: ICD-10-CM

## 2023-05-09 LAB
ABO + RH BLD: NORMAL
AMPHET UR QL SCN: NOT DETECTED
APTT BLD: 31.4 SEC (ref 24.5–35.1)
BARBITURATES UR QL SCN: NOT DETECTED
BENZODIAZ UR QL SCN: POSITIVE
BLD GP AB SCN SERPL QL: NORMAL
CANNABINOIDS UR QL SCN: NOT DETECTED
COCAINE UR QL SCN: NOT DETECTED
DRUG SCREEN COMMENT UR-IMP: ABNORMAL
ERYTHROCYTE [DISTWIDTH] IN BLOOD BY AUTOMATED COUNT: 13 FL (ref 11.5–15)
FENTANYL SCREEN, URINE: NOT DETECTED
HCT VFR BLD AUTO: 44 % (ref 37–54)
HGB BLD-MCNC: 14.7 G/DL (ref 12.5–16.5)
INR BLD: 1.1
MCH RBC QN AUTO: 31.7 PG (ref 26–35)
MCHC RBC AUTO-ENTMCNC: 33.4 % (ref 32–34.5)
MCV RBC AUTO: 94.8 FL (ref 80–99.9)
METHADONE UR QL SCN: NOT DETECTED
OPIATES UR QL SCN: POSITIVE
OXYCODONE URINE: NOT DETECTED
PCP UR QL SCN: NOT DETECTED
PLATELET # BLD AUTO: 217 E9/L (ref 130–450)
PMV BLD AUTO: 9.5 FL (ref 7–12)
PROTHROMBIN TIME: 11.9 SEC (ref 9.3–12.4)
RBC # BLD AUTO: 4.64 E12/L (ref 3.8–5.8)
WBC # BLD: 12.6 E9/L (ref 4.5–11.5)

## 2023-05-09 PROCEDURE — 90471 IMMUNIZATION ADMIN: CPT | Performed by: EMERGENCY MEDICINE

## 2023-05-09 PROCEDURE — 73700 CT LOWER EXTREMITY W/O DYE: CPT

## 2023-05-09 PROCEDURE — 6360000002 HC RX W HCPCS

## 2023-05-09 PROCEDURE — 99285 EMERGENCY DEPT VISIT HI MDM: CPT

## 2023-05-09 PROCEDURE — 2500000003 HC RX 250 WO HCPCS

## 2023-05-09 PROCEDURE — 86850 RBC ANTIBODY SCREEN: CPT

## 2023-05-09 PROCEDURE — 2580000003 HC RX 258: Performed by: EMERGENCY MEDICINE

## 2023-05-09 PROCEDURE — 82077 ASSAY SPEC XCP UR&BREATH IA: CPT

## 2023-05-09 PROCEDURE — 73590 X-RAY EXAM OF LOWER LEG: CPT

## 2023-05-09 PROCEDURE — 96374 THER/PROPH/DIAG INJ IV PUSH: CPT

## 2023-05-09 PROCEDURE — 73630 X-RAY EXAM OF FOOT: CPT

## 2023-05-09 PROCEDURE — 86900 BLOOD TYPING SEROLOGIC ABO: CPT

## 2023-05-09 PROCEDURE — 73560 X-RAY EXAM OF KNEE 1 OR 2: CPT

## 2023-05-09 PROCEDURE — 86901 BLOOD TYPING SEROLOGIC RH(D): CPT

## 2023-05-09 PROCEDURE — 73610 X-RAY EXAM OF ANKLE: CPT

## 2023-05-09 PROCEDURE — 90714 TD VACC NO PRESV 7 YRS+ IM: CPT | Performed by: EMERGENCY MEDICINE

## 2023-05-09 PROCEDURE — 85730 THROMBOPLASTIN TIME PARTIAL: CPT

## 2023-05-09 PROCEDURE — 6370000000 HC RX 637 (ALT 250 FOR IP)

## 2023-05-09 PROCEDURE — 80307 DRUG TEST PRSMV CHEM ANLYZR: CPT

## 2023-05-09 PROCEDURE — 6360000002 HC RX W HCPCS: Performed by: EMERGENCY MEDICINE

## 2023-05-09 PROCEDURE — 27840 TREAT ANKLE DISLOCATION: CPT

## 2023-05-09 PROCEDURE — 2500000003 HC RX 250 WO HCPCS: Performed by: EMERGENCY MEDICINE

## 2023-05-09 PROCEDURE — 1200000000 HC SEMI PRIVATE

## 2023-05-09 PROCEDURE — 85610 PROTHROMBIN TIME: CPT

## 2023-05-09 PROCEDURE — 85027 COMPLETE CBC AUTOMATED: CPT

## 2023-05-09 PROCEDURE — 96376 TX/PRO/DX INJ SAME DRUG ADON: CPT

## 2023-05-09 RX ORDER — ALBUTEROL SULFATE 90 UG/1
2 AEROSOL, METERED RESPIRATORY (INHALATION) EVERY 6 HOURS PRN
COMMUNITY

## 2023-05-09 RX ORDER — MIDAZOLAM HYDROCHLORIDE 2 MG/2ML
1 INJECTION, SOLUTION INTRAMUSCULAR; INTRAVENOUS ONCE
Status: COMPLETED | OUTPATIENT
Start: 2023-05-09 | End: 2023-05-09

## 2023-05-09 RX ORDER — OXYCODONE HYDROCHLORIDE 5 MG/1
5 TABLET ORAL EVERY 4 HOURS PRN
Status: DISCONTINUED | OUTPATIENT
Start: 2023-05-09 | End: 2023-05-12 | Stop reason: HOSPADM

## 2023-05-09 RX ORDER — KETAMINE HYDROCHLORIDE 10 MG/ML
1 INJECTION INTRAMUSCULAR; INTRAVENOUS ONCE
Status: DISCONTINUED | OUTPATIENT
Start: 2023-05-09 | End: 2023-05-09

## 2023-05-09 RX ORDER — SODIUM CHLORIDE 9 MG/ML
INJECTION, SOLUTION INTRAVENOUS CONTINUOUS
Status: DISCONTINUED | OUTPATIENT
Start: 2023-05-09 | End: 2023-05-12 | Stop reason: HOSPADM

## 2023-05-09 RX ORDER — TETANUS AND DIPHTHERIA TOXOIDS ADSORBED 2; 2 [LF]/.5ML; [LF]/.5ML
0.5 INJECTION INTRAMUSCULAR ONCE
Status: COMPLETED | OUTPATIENT
Start: 2023-05-09 | End: 2023-05-09

## 2023-05-09 RX ORDER — ACETAMINOPHEN 325 MG/1
650 TABLET ORAL EVERY 6 HOURS SCHEDULED
Status: DISCONTINUED | OUTPATIENT
Start: 2023-05-09 | End: 2023-05-12 | Stop reason: HOSPADM

## 2023-05-09 RX ORDER — QUETIAPINE FUMARATE 100 MG/1
100 TABLET, FILM COATED ORAL 2 TIMES DAILY
COMMUNITY

## 2023-05-09 RX ORDER — POLYETHYLENE GLYCOL 3350 17 G/17G
17 POWDER, FOR SOLUTION ORAL DAILY
Status: DISCONTINUED | OUTPATIENT
Start: 2023-05-10 | End: 2023-05-12 | Stop reason: HOSPADM

## 2023-05-09 RX ORDER — SODIUM CHLORIDE 0.9 % (FLUSH) 0.9 %
10 SYRINGE (ML) INJECTION PRN
Status: DISCONTINUED | OUTPATIENT
Start: 2023-05-09 | End: 2023-05-12 | Stop reason: HOSPADM

## 2023-05-09 RX ORDER — ONDANSETRON 4 MG/1
4 TABLET, ORALLY DISINTEGRATING ORAL EVERY 8 HOURS PRN
Status: DISCONTINUED | OUTPATIENT
Start: 2023-05-09 | End: 2023-05-12 | Stop reason: HOSPADM

## 2023-05-09 RX ORDER — MIDAZOLAM HYDROCHLORIDE 1 MG/ML
INJECTION INTRAMUSCULAR; INTRAVENOUS
Status: COMPLETED
Start: 2023-05-09 | End: 2023-05-09

## 2023-05-09 RX ORDER — ONDANSETRON 2 MG/ML
4 INJECTION INTRAMUSCULAR; INTRAVENOUS EVERY 6 HOURS PRN
Status: DISCONTINUED | OUTPATIENT
Start: 2023-05-09 | End: 2023-05-12 | Stop reason: HOSPADM

## 2023-05-09 RX ORDER — KETAMINE HCL IN NACL, ISO-OSM 100MG/10ML
SYRINGE (ML) INJECTION
Status: COMPLETED
Start: 2023-05-09 | End: 2023-05-09

## 2023-05-09 RX ORDER — MELOXICAM 7.5 MG/1
7.5 TABLET ORAL DAILY
Status: DISCONTINUED | OUTPATIENT
Start: 2023-05-10 | End: 2023-05-12 | Stop reason: HOSPADM

## 2023-05-09 RX ORDER — HYDROMORPHONE HYDROCHLORIDE 1 MG/ML
0.5 INJECTION, SOLUTION INTRAMUSCULAR; INTRAVENOUS; SUBCUTANEOUS
Status: DISCONTINUED | OUTPATIENT
Start: 2023-05-09 | End: 2023-05-12 | Stop reason: HOSPADM

## 2023-05-09 RX ORDER — MIDAZOLAM HYDROCHLORIDE 2 MG/2ML
0.5 INJECTION, SOLUTION INTRAMUSCULAR; INTRAVENOUS ONCE
Status: COMPLETED | OUTPATIENT
Start: 2023-05-09 | End: 2023-05-09

## 2023-05-09 RX ORDER — SODIUM CHLORIDE 0.9 % (FLUSH) 0.9 %
10 SYRINGE (ML) INJECTION EVERY 12 HOURS SCHEDULED
Status: DISCONTINUED | OUTPATIENT
Start: 2023-05-09 | End: 2023-05-12 | Stop reason: HOSPADM

## 2023-05-09 RX ORDER — OXYCODONE HYDROCHLORIDE 10 MG/1
10 TABLET ORAL EVERY 4 HOURS PRN
Status: DISCONTINUED | OUTPATIENT
Start: 2023-05-09 | End: 2023-05-12 | Stop reason: HOSPADM

## 2023-05-09 RX ORDER — ORPHENADRINE CITRATE 30 MG/ML
60 INJECTION INTRAMUSCULAR; INTRAVENOUS ONCE
Status: COMPLETED | OUTPATIENT
Start: 2023-05-09 | End: 2023-05-09

## 2023-05-09 RX ORDER — PROPOFOL 10 MG/ML
180 INJECTION, EMULSION INTRAVENOUS ONCE
Status: COMPLETED | OUTPATIENT
Start: 2023-05-09 | End: 2023-05-09

## 2023-05-09 RX ORDER — KETAMINE HCL IN NACL, ISO-OSM 100MG/10ML
100 SYRINGE (ML) INJECTION ONCE
Status: COMPLETED | OUTPATIENT
Start: 2023-05-09 | End: 2023-05-09

## 2023-05-09 RX ORDER — PROPOFOL 10 MG/ML
1 INJECTION, EMULSION INTRAVENOUS ONCE
Status: DISCONTINUED | OUTPATIENT
Start: 2023-05-09 | End: 2023-05-09

## 2023-05-09 RX ORDER — ONDANSETRON 2 MG/ML
4 INJECTION INTRAMUSCULAR; INTRAVENOUS EVERY 6 HOURS PRN
Status: DISCONTINUED | OUTPATIENT
Start: 2023-05-09 | End: 2023-05-09 | Stop reason: SDUPTHER

## 2023-05-09 RX ORDER — SODIUM CHLORIDE 9 MG/ML
INJECTION, SOLUTION INTRAVENOUS PRN
Status: DISCONTINUED | OUTPATIENT
Start: 2023-05-09 | End: 2023-05-12 | Stop reason: HOSPADM

## 2023-05-09 RX ORDER — HYDROMORPHONE HYDROCHLORIDE 1 MG/ML
0.25 INJECTION, SOLUTION INTRAMUSCULAR; INTRAVENOUS; SUBCUTANEOUS
Status: DISCONTINUED | OUTPATIENT
Start: 2023-05-09 | End: 2023-05-12 | Stop reason: HOSPADM

## 2023-05-09 RX ADMIN — MIDAZOLAM 0.5 MG: 1 INJECTION INTRAMUSCULAR; INTRAVENOUS at 17:56

## 2023-05-09 RX ADMIN — MIDAZOLAM HYDROCHLORIDE 0.5 MG: 2 INJECTION, SOLUTION INTRAMUSCULAR; INTRAVENOUS at 17:56

## 2023-05-09 RX ADMIN — Medication 100 MG: at 17:28

## 2023-05-09 RX ADMIN — MIDAZOLAM 1 MG: 1 INJECTION INTRAMUSCULAR; INTRAVENOUS at 17:31

## 2023-05-09 RX ADMIN — HYDROMORPHONE HYDROCHLORIDE 0.5 MG: 1 INJECTION, SOLUTION INTRAMUSCULAR; INTRAVENOUS; SUBCUTANEOUS at 19:47

## 2023-05-09 RX ADMIN — OXYCODONE HYDROCHLORIDE 10 MG: 10 TABLET ORAL at 23:53

## 2023-05-09 RX ADMIN — ACETAMINOPHEN 650 MG: 325 TABLET, FILM COATED ORAL at 23:16

## 2023-05-09 RX ADMIN — HYDROMORPHONE HYDROCHLORIDE 0.5 MG: 1 INJECTION, SOLUTION INTRAMUSCULAR; INTRAVENOUS; SUBCUTANEOUS at 23:08

## 2023-05-09 RX ADMIN — HYDROMORPHONE HYDROCHLORIDE 0.5 MG: 1 INJECTION, SOLUTION INTRAMUSCULAR; INTRAVENOUS; SUBCUTANEOUS at 16:49

## 2023-05-09 RX ADMIN — SODIUM CHLORIDE: 9 INJECTION, SOLUTION INTRAVENOUS at 16:30

## 2023-05-09 RX ADMIN — ORPHENADRINE CITRATE 60 MG: 60 INJECTION INTRAMUSCULAR; INTRAVENOUS at 16:51

## 2023-05-09 RX ADMIN — TETANUS AND DIPHTHERIA TOXOIDS ADSORBED 0.5 ML: 2; 2 INJECTION INTRAMUSCULAR at 18:48

## 2023-05-09 RX ADMIN — PROPOFOL 180 MG: 10 INJECTION, EMULSION INTRAVENOUS at 17:31

## 2023-05-09 ASSESSMENT — PAIN SCALES - GENERAL
PAINLEVEL_OUTOF10: 9
PAINLEVEL_OUTOF10: 10

## 2023-05-09 ASSESSMENT — PAIN DESCRIPTION - ORIENTATION: ORIENTATION: RIGHT

## 2023-05-09 ASSESSMENT — PAIN DESCRIPTION - LOCATION: LOCATION: LEG

## 2023-05-09 NOTE — CONSULTS
knee, mild pain with range of motion right knee posterior side, no pain with range of motion of the right hip. Compartments soft and compressible  Palpable dorsalis pedis and posterior tibialis pulse, brisk cap refill to toes, foot warm and perfused  Sensation intact to light touch in sural/deep peroneal/superficial peroneal/saphenous/posterior tibial nerve distributions to foot/ankle  Patient unable to demonstrate plantarflexion dorsiflexion of the right ankle due to pain however he does have great toe extension. Secondary Exam:   bilateralUE: No obvious signs of trauma. -TTP to fingers, hand, wrist, forearm, elbow, humerus, shoulder or clavicle. -- Patient able to flex/extend fingers, wrist, elbow and shoulder with active and passive ROM without pain, +2/4 Radial pulse, cap refill <3sec, +AIN/PIN/Radial/Ulnar/Median N, distal sensation grossly intact to C4-T1 dermatomes, compartments soft and compressible. leftLE: No obvious signs of trauma. -TTP to  ankle, leg, knee, thigh, hip patient does have some mild tenderness over the dorsal aspect of the left foot and 1 local area. .-- Patient able to flex/extend toes, ankle, knee and hip with active and passive ROM without pain,+2/4 DP & PT pulses, cap refill <3sec, +5/5 PF/DF/EHL, distal sensation grossly intact to L4-S1 dermatomes, compartments soft and compressible. Pelvis: -TTP, -Log roll     DATA:    CBC: No results found for: WBC, RBC, HGB, HCT, MCV, MCH, MCHC, RDW, PLT, MPV  PT/INR:  No results found for: PROTIME, INR    Radiology Review:  X-ray of the fibula and tibia demonstrate no obvious acute fracture dislocation. The joint line of the knee joint appears intact. X-ray of the right knee demonstrate no acute dislocation, there is some possible osseous abnormality about the lateral tibial spine, difficult to assess on x-ray will get CT to rule out fracture.   No other acute fracture dislocation noted  X-ray of the right foot demonstrate a Lisfranc

## 2023-05-09 NOTE — DISCHARGE INSTRUCTIONS
Procedural Sedation, Adult  Medicine(s) were given today to help you feel comfortable and relaxed during tests and/or treatments. Like any medicines, these can cause unwanted but temporary effects. HOME CARE INSTRUCTIONS  Warning: The medicine(s) given today may cause temporary drowsiness, clumsiness or poor balance. Poor judgment can also result before the medicine(s) wear off. Someone should meet you and drive you home. Do not drive, operate machinery, use power or hand tools for the next 12 hours. Do not make important decisions until improved. Avoid exercising, bicycling, swimming, climbing ladders, or working at heights for the remainder of the day. You may temporarily feel sick, weak, or dizzy. This is expected. Vomiting may occur if you eat too soon. When you can drink without vomiting, try water, juice, or soup. Try solid foods if you feel little or no nausea and continue to feel better. If pain killers have been prescribed for you, ask your caregiver how soon it is safe to take them. You should not drink alcohol, take sleeping pills, or medicines that cause drowsiness for at least 24 hours. If you smoke, do not smoke alone. Only take over-the-counter or prescription medicines for pain, discomfort, or fever as directed by your caregiver. If you are feeling better, you may resume normal activities 24 hours after receiving sedation. SEEK MEDICAL CARE IF:  Breathing problems develop. Your skin is pale or bluish in color. Pain is getting worse and not helped by medication. There is bleeding or swelling. A temperature over 101º F (38.3º C) develops. You are still sleepy or feeling clumsy after 24 hours.      [facility] Department of Orthopedic Surgery  [address], MD [address] MD Jenni Busman, PA-C   Joby Travis PA-C      Orthopaedics Discharge Instructions

## 2023-05-09 NOTE — ED PROVIDER NOTES
metatarsals may still be in somewhat relatively typical   alignment with the cuboid. The 1st and 2nd metatarsals are displaced   dorsally and the 2nd metatarsal is also distracted medially from the 3rd   metatarsal.  A fracture fragment is seen between the 1st metatarsal and   cuneiforms and whether this arises from the base of the 1st metatarsal or   more likely 1 of the cuneiforms  is unknown. A CT scan of the foot would be   better able demonstrate malalignment and fractures. 2.  Pronounced soft tissue swelling about the dorsum of the midfoot. 3.  Probable chronic calcification though chip fracture cannot be excluded in   the area of the dorsal talonavicular joint. XR FOOT LEFT (MIN 3 VIEWS)   Final Result      No acute findings in the left foot. XR TIBIA FIBULA RIGHT (2 VIEWS)   Final Result   No acute osseous abnormality. XR KNEE RIGHT (1-2 VIEWS)   Final Result   No acute abnormality of the knee. No results found. No results found. PROCEDURES   Unless otherwise noted below, none        -------------------------------- Conscious Sedation Procedure Note -----------------------------  Patient Name: Dasha Crowder   Medical Record Number: [id number]  Date: 5/9/23   Time: 4:58 PM EDT   Room/Bed: 10/10    Indication: fracture dislocation and procedural pain management  Consent: I have discussed with the patient and/or the patient representative the indication, alternatives, and the possible risks and/or complications of the planned procedure and the anesthesia methods. The patient and/or patient representative appear to understand and agree to proceed. Physician Involvement: The attending physician was present and supervising this procedure. 5/9/23     Time: 2256 (pre-procedure start time)  Pre-Sedation Documentation and Exam: I have personally completed a history, physical exam & review of systems for this patient (see notes).   Airway Assessment: normal,

## 2023-05-09 NOTE — CONSULTS
critical condition of this patient, I request the immediate release of blood products for emergency transfusion secondary to shock. I understand the increased risks incurred by the lack of complete transfusion testing. Radiology: Chest Xray  and Pelvic Xray  XR right foot, ankle, knee, hip, left foot/ankle    Consultations:  Orthopedic surgery    Admission/Diagnosis: Right lisfranc fracture    Plan of Treatment:  Management of right Lisfranc fracture per orthopedic surgery  No other noted traumatic injury or signs of trauma on physical exam.  No further trauma surgery needs at this point. Tertiary exam  Dispo of patient per orthopedic surgery.     Plan discussed with Dr. Radha De La Torre    at 5/9/2023 on 6:39 PM    Electronically signed by Julissa Pierce DO on 5/9/2023 at 6:39 PM

## 2023-05-10 ENCOUNTER — APPOINTMENT (OUTPATIENT)
Dept: GENERAL RADIOLOGY | Age: 38
DRG: 314 | End: 2023-05-10
Payer: COMMERCIAL

## 2023-05-10 ENCOUNTER — ANESTHESIA EVENT (OUTPATIENT)
Dept: OPERATING ROOM | Age: 38
DRG: 314 | End: 2023-05-10
Payer: COMMERCIAL

## 2023-05-10 ENCOUNTER — ANESTHESIA (OUTPATIENT)
Dept: OPERATING ROOM | Age: 38
DRG: 314 | End: 2023-05-10
Payer: COMMERCIAL

## 2023-05-10 LAB
ANION GAP SERPL CALCULATED.3IONS-SCNC: 7 MMOL/L (ref 7–16)
APTT BLD: 31.1 SEC (ref 24.5–35.1)
BASOPHILS # BLD: 0.03 E9/L (ref 0–0.2)
BASOPHILS NFR BLD: 0.3 % (ref 0–2)
BUN SERPL-MCNC: 16 MG/DL (ref 6–20)
CALCIUM SERPL-MCNC: 8.8 MG/DL (ref 8.6–10.2)
CHLORIDE SERPL-SCNC: 106 MMOL/L (ref 98–107)
CO2 SERPL-SCNC: 26 MMOL/L (ref 22–29)
CREAT SERPL-MCNC: 0.9 MG/DL (ref 0.7–1.2)
EOSINOPHIL # BLD: 0.22 E9/L (ref 0.05–0.5)
EOSINOPHIL NFR BLD: 2.1 % (ref 0–6)
ERYTHROCYTE [DISTWIDTH] IN BLOOD BY AUTOMATED COUNT: 13.2 FL (ref 11.5–15)
ETHANOLAMINE SERPL-MCNC: <10 MG/DL (ref 0–0.08)
GLUCOSE SERPL-MCNC: 131 MG/DL (ref 74–99)
HCT VFR BLD AUTO: 41.5 % (ref 37–54)
HGB BLD-MCNC: 14.1 G/DL (ref 12.5–16.5)
IMM GRANULOCYTES # BLD: 0.03 E9/L
IMM GRANULOCYTES NFR BLD: 0.3 % (ref 0–5)
LYMPHOCYTES # BLD: 3.67 E9/L (ref 1.5–4)
LYMPHOCYTES NFR BLD: 35.2 % (ref 20–42)
MCH RBC QN AUTO: 32 PG (ref 26–35)
MCHC RBC AUTO-ENTMCNC: 34 % (ref 32–34.5)
MCV RBC AUTO: 94.3 FL (ref 80–99.9)
MONOCYTES # BLD: 1.02 E9/L (ref 0.1–0.95)
MONOCYTES NFR BLD: 9.8 % (ref 2–12)
NEUTROPHILS # BLD: 5.45 E9/L (ref 1.8–7.3)
NEUTS SEG NFR BLD: 52.3 % (ref 43–80)
PLATELET # BLD AUTO: 211 E9/L (ref 130–450)
PMV BLD AUTO: 9.5 FL (ref 7–12)
POTASSIUM SERPL-SCNC: 4 MMOL/L (ref 3.5–5)
RBC # BLD AUTO: 4.4 E12/L (ref 3.8–5.8)
SODIUM SERPL-SCNC: 139 MMOL/L (ref 132–146)
WBC # BLD: 10.4 E9/L (ref 4.5–11.5)

## 2023-05-10 PROCEDURE — 6370000000 HC RX 637 (ALT 250 FOR IP): Performed by: STUDENT IN AN ORGANIZED HEALTH CARE EDUCATION/TRAINING PROGRAM

## 2023-05-10 PROCEDURE — 3209999900 FLUORO FOR SURGICAL PROCEDURES

## 2023-05-10 PROCEDURE — 6360000002 HC RX W HCPCS: Performed by: ANESTHESIOLOGY

## 2023-05-10 PROCEDURE — 0QSLXZZ REPOSITION RIGHT TARSAL, EXTERNAL APPROACH: ICD-10-PCS | Performed by: ORTHOPAEDIC SURGERY

## 2023-05-10 PROCEDURE — 2580000003 HC RX 258

## 2023-05-10 PROCEDURE — 0SSK3ZZ REPOSITION RIGHT TARSOMETATARSAL JOINT, PERCUTANEOUS APPROACH: ICD-10-PCS | Performed by: ORTHOPAEDIC SURGERY

## 2023-05-10 PROCEDURE — 2720000010 HC SURG SUPPLY STERILE: Performed by: ORTHOPAEDIC SURGERY

## 2023-05-10 PROCEDURE — 6360000002 HC RX W HCPCS: Performed by: STUDENT IN AN ORGANIZED HEALTH CARE EDUCATION/TRAINING PROGRAM

## 2023-05-10 PROCEDURE — 7100000001 HC PACU RECOVERY - ADDTL 15 MIN: Performed by: ORTHOPAEDIC SURGERY

## 2023-05-10 PROCEDURE — 6360000002 HC RX W HCPCS

## 2023-05-10 PROCEDURE — 1200000000 HC SEMI PRIVATE

## 2023-05-10 PROCEDURE — 2709999900 HC NON-CHARGEABLE SUPPLY: Performed by: ORTHOPAEDIC SURGERY

## 2023-05-10 PROCEDURE — 2580000003 HC RX 258: Performed by: ANESTHESIOLOGIST ASSISTANT

## 2023-05-10 PROCEDURE — 6370000000 HC RX 637 (ALT 250 FOR IP)

## 2023-05-10 PROCEDURE — 0SSKXZZ REPOSITION RIGHT TARSOMETATARSAL JOINT, EXTERNAL APPROACH: ICD-10-PCS | Performed by: ORTHOPAEDIC SURGERY

## 2023-05-10 PROCEDURE — 2500000003 HC RX 250 WO HCPCS: Performed by: EMERGENCY MEDICINE

## 2023-05-10 PROCEDURE — 0QSNXZZ REPOSITION RIGHT METATARSAL, EXTERNAL APPROACH: ICD-10-PCS | Performed by: ORTHOPAEDIC SURGERY

## 2023-05-10 PROCEDURE — C1769 GUIDE WIRE: HCPCS | Performed by: ORTHOPAEDIC SURGERY

## 2023-05-10 PROCEDURE — C1713 ANCHOR/SCREW BN/BN,TIS/BN: HCPCS | Performed by: ORTHOPAEDIC SURGERY

## 2023-05-10 PROCEDURE — 80048 BASIC METABOLIC PNL TOTAL CA: CPT

## 2023-05-10 PROCEDURE — 7100000000 HC PACU RECOVERY - FIRST 15 MIN: Performed by: ORTHOPAEDIC SURGERY

## 2023-05-10 PROCEDURE — 6370000000 HC RX 637 (ALT 250 FOR IP): Performed by: ORTHOPAEDIC SURGERY

## 2023-05-10 PROCEDURE — 64447 NJX AA&/STRD FEMORAL NRV IMG: CPT | Performed by: ANESTHESIOLOGY

## 2023-05-10 PROCEDURE — 3600000014 HC SURGERY LEVEL 4 ADDTL 15MIN: Performed by: ORTHOPAEDIC SURGERY

## 2023-05-10 PROCEDURE — 64445 NJX AA&/STRD SCIATIC NRV IMG: CPT | Performed by: ANESTHESIOLOGY

## 2023-05-10 PROCEDURE — 3700000001 HC ADD 15 MINUTES (ANESTHESIA): Performed by: ORTHOPAEDIC SURGERY

## 2023-05-10 PROCEDURE — 2580000003 HC RX 258: Performed by: STUDENT IN AN ORGANIZED HEALTH CARE EDUCATION/TRAINING PROGRAM

## 2023-05-10 PROCEDURE — 2580000003 HC RX 258: Performed by: EMERGENCY MEDICINE

## 2023-05-10 PROCEDURE — 3600000004 HC SURGERY LEVEL 4 BASE: Performed by: ORTHOPAEDIC SURGERY

## 2023-05-10 PROCEDURE — 2500000003 HC RX 250 WO HCPCS: Performed by: ANESTHESIOLOGY

## 2023-05-10 PROCEDURE — 99254 IP/OBS CNSLTJ NEW/EST MOD 60: CPT | Performed by: ORTHOPAEDIC SURGERY

## 2023-05-10 PROCEDURE — 2500000003 HC RX 250 WO HCPCS: Performed by: STUDENT IN AN ORGANIZED HEALTH CARE EDUCATION/TRAINING PROGRAM

## 2023-05-10 PROCEDURE — 85025 COMPLETE CBC W/AUTO DIFF WBC: CPT

## 2023-05-10 PROCEDURE — 6360000002 HC RX W HCPCS: Performed by: ANESTHESIOLOGIST ASSISTANT

## 2023-05-10 PROCEDURE — 0SSK04Z REPOSITION RIGHT TARSOMETATARSAL JOINT WITH INTERNAL FIXATION DEVICE, OPEN APPROACH: ICD-10-PCS | Performed by: ORTHOPAEDIC SURGERY

## 2023-05-10 PROCEDURE — 3700000000 HC ANESTHESIA ATTENDED CARE: Performed by: ORTHOPAEDIC SURGERY

## 2023-05-10 PROCEDURE — 73630 X-RAY EXAM OF FOOT: CPT

## 2023-05-10 PROCEDURE — 85730 THROMBOPLASTIN TIME PARTIAL: CPT

## 2023-05-10 DEVICE — SCREW BNE L40MM DIA4MM S STL CANN SHT 1/3 THRD SM HEX SOCK: Type: IMPLANTABLE DEVICE | Site: FOOT | Status: FUNCTIONAL

## 2023-05-10 DEVICE — IMPLANTABLE DEVICE: Type: IMPLANTABLE DEVICE | Site: FOOT | Status: FUNCTIONAL

## 2023-05-10 RX ORDER — ONDANSETRON 2 MG/ML
4 INJECTION INTRAMUSCULAR; INTRAVENOUS
Status: DISCONTINUED | OUTPATIENT
Start: 2023-05-10 | End: 2023-05-10 | Stop reason: HOSPADM

## 2023-05-10 RX ORDER — OXYCODONE HYDROCHLORIDE AND ACETAMINOPHEN 5; 325 MG/1; MG/1
1 TABLET ORAL EVERY 6 HOURS PRN
Qty: 28 TABLET | Refills: 0 | Status: SHIPPED | OUTPATIENT
Start: 2023-05-10 | End: 2023-05-17

## 2023-05-10 RX ORDER — DEXAMETHASONE SODIUM PHOSPHATE 10 MG/ML
4 INJECTION, SOLUTION INTRAMUSCULAR; INTRAVENOUS ONCE
Status: COMPLETED | OUTPATIENT
Start: 2023-05-10 | End: 2023-05-10

## 2023-05-10 RX ORDER — MIDAZOLAM HYDROCHLORIDE 2 MG/2ML
1 INJECTION, SOLUTION INTRAMUSCULAR; INTRAVENOUS PRN
Status: DISCONTINUED | OUTPATIENT
Start: 2023-05-10 | End: 2023-05-10 | Stop reason: HOSPADM

## 2023-05-10 RX ORDER — MEPERIDINE HYDROCHLORIDE 25 MG/ML
12.5 INJECTION INTRAMUSCULAR; INTRAVENOUS; SUBCUTANEOUS
Status: DISCONTINUED | OUTPATIENT
Start: 2023-05-10 | End: 2023-05-10 | Stop reason: HOSPADM

## 2023-05-10 RX ORDER — BACITRACIN ZINC 500 [USP'U]/G
OINTMENT TOPICAL PRN
Status: DISCONTINUED | OUTPATIENT
Start: 2023-05-10 | End: 2023-05-10 | Stop reason: ALTCHOICE

## 2023-05-10 RX ORDER — LIDOCAINE HYDROCHLORIDE 20 MG/ML
INJECTION, SOLUTION INTRAVENOUS PRN
Status: DISCONTINUED | OUTPATIENT
Start: 2023-05-10 | End: 2023-05-10 | Stop reason: SDUPTHER

## 2023-05-10 RX ORDER — SODIUM CHLORIDE 9 MG/ML
INJECTION, SOLUTION INTRAVENOUS PRN
Status: DISCONTINUED | OUTPATIENT
Start: 2023-05-10 | End: 2023-05-10 | Stop reason: HOSPADM

## 2023-05-10 RX ORDER — ROPIVACAINE HYDROCHLORIDE 5 MG/ML
INJECTION, SOLUTION EPIDURAL; INFILTRATION; PERINEURAL
Status: COMPLETED | OUTPATIENT
Start: 2023-05-10 | End: 2023-05-10

## 2023-05-10 RX ORDER — SODIUM CHLORIDE 0.9 % (FLUSH) 0.9 %
5-40 SYRINGE (ML) INJECTION PRN
Status: DISCONTINUED | OUTPATIENT
Start: 2023-05-10 | End: 2023-05-10 | Stop reason: HOSPADM

## 2023-05-10 RX ORDER — SODIUM CHLORIDE 0.9 % (FLUSH) 0.9 %
5-40 SYRINGE (ML) INJECTION PRN
Status: DISCONTINUED | OUTPATIENT
Start: 2023-05-10 | End: 2023-05-12 | Stop reason: HOSPADM

## 2023-05-10 RX ORDER — SODIUM CHLORIDE 0.9 % (FLUSH) 0.9 %
5-40 SYRINGE (ML) INJECTION EVERY 12 HOURS SCHEDULED
Status: DISCONTINUED | OUTPATIENT
Start: 2023-05-10 | End: 2023-05-10 | Stop reason: HOSPADM

## 2023-05-10 RX ORDER — DEXAMETHASONE SODIUM PHOSPHATE 10 MG/ML
INJECTION INTRAMUSCULAR; INTRAVENOUS PRN
Status: DISCONTINUED | OUTPATIENT
Start: 2023-05-10 | End: 2023-05-10 | Stop reason: SDUPTHER

## 2023-05-10 RX ORDER — PROPOFOL 10 MG/ML
INJECTION, EMULSION INTRAVENOUS PRN
Status: DISCONTINUED | OUTPATIENT
Start: 2023-05-10 | End: 2023-05-10 | Stop reason: SDUPTHER

## 2023-05-10 RX ORDER — QUETIAPINE FUMARATE 25 MG/1
100 TABLET, FILM COATED ORAL 2 TIMES DAILY
Status: DISCONTINUED | OUTPATIENT
Start: 2023-05-10 | End: 2023-05-12 | Stop reason: HOSPADM

## 2023-05-10 RX ORDER — ROPIVACAINE HYDROCHLORIDE 5 MG/ML
60 INJECTION, SOLUTION EPIDURAL; INFILTRATION; PERINEURAL ONCE
Status: COMPLETED | OUTPATIENT
Start: 2023-05-10 | End: 2023-05-10

## 2023-05-10 RX ORDER — SODIUM CHLORIDE 0.9 % (FLUSH) 0.9 %
5-40 SYRINGE (ML) INJECTION EVERY 12 HOURS SCHEDULED
Status: DISCONTINUED | OUTPATIENT
Start: 2023-05-10 | End: 2023-05-12 | Stop reason: HOSPADM

## 2023-05-10 RX ORDER — FENTANYL CITRATE 50 UG/ML
INJECTION, SOLUTION INTRAMUSCULAR; INTRAVENOUS PRN
Status: DISCONTINUED | OUTPATIENT
Start: 2023-05-10 | End: 2023-05-10 | Stop reason: SDUPTHER

## 2023-05-10 RX ORDER — ONDANSETRON 2 MG/ML
INJECTION INTRAMUSCULAR; INTRAVENOUS PRN
Status: DISCONTINUED | OUTPATIENT
Start: 2023-05-10 | End: 2023-05-10 | Stop reason: SDUPTHER

## 2023-05-10 RX ORDER — ASPIRIN 325 MG
325 TABLET, DELAYED RELEASE (ENTERIC COATED) ORAL 2 TIMES DAILY
Qty: 60 TABLET | Refills: 0 | Status: SHIPPED | OUTPATIENT
Start: 2023-05-10 | End: 2023-06-09

## 2023-05-10 RX ORDER — HYDROMORPHONE HYDROCHLORIDE 1 MG/ML
0.25 INJECTION, SOLUTION INTRAMUSCULAR; INTRAVENOUS; SUBCUTANEOUS EVERY 5 MIN PRN
Status: DISCONTINUED | OUTPATIENT
Start: 2023-05-10 | End: 2023-05-10 | Stop reason: HOSPADM

## 2023-05-10 RX ORDER — SODIUM CHLORIDE 9 MG/ML
INJECTION, SOLUTION INTRAVENOUS CONTINUOUS PRN
Status: DISCONTINUED | OUTPATIENT
Start: 2023-05-10 | End: 2023-05-10 | Stop reason: SDUPTHER

## 2023-05-10 RX ORDER — SODIUM CHLORIDE 9 MG/ML
INJECTION, SOLUTION INTRAVENOUS PRN
Status: DISCONTINUED | OUTPATIENT
Start: 2023-05-10 | End: 2023-05-12 | Stop reason: HOSPADM

## 2023-05-10 RX ORDER — HYDROMORPHONE HYDROCHLORIDE 1 MG/ML
0.5 INJECTION, SOLUTION INTRAMUSCULAR; INTRAVENOUS; SUBCUTANEOUS EVERY 5 MIN PRN
Status: DISCONTINUED | OUTPATIENT
Start: 2023-05-10 | End: 2023-05-10 | Stop reason: HOSPADM

## 2023-05-10 RX ADMIN — LIDOCAINE HYDROCHLORIDE 100 MG: 20 INJECTION, SOLUTION INTRAVENOUS at 09:23

## 2023-05-10 RX ADMIN — HYDROMORPHONE HYDROCHLORIDE 0.5 MG: 1 INJECTION, SOLUTION INTRAMUSCULAR; INTRAVENOUS; SUBCUTANEOUS at 11:24

## 2023-05-10 RX ADMIN — HYDROMORPHONE HYDROCHLORIDE 0.25 MG: 1 INJECTION, SOLUTION INTRAMUSCULAR; INTRAVENOUS; SUBCUTANEOUS at 08:02

## 2023-05-10 RX ADMIN — ROPIVACAINE HYDROCHLORIDE 30 ML: 5 INJECTION EPIDURAL; INFILTRATION; PERINEURAL at 09:03

## 2023-05-10 RX ADMIN — SODIUM CHLORIDE, PRESERVATIVE FREE 10 ML: 5 INJECTION INTRAVENOUS at 20:10

## 2023-05-10 RX ADMIN — HYDROMORPHONE HYDROCHLORIDE 0.5 MG: 1 INJECTION, SOLUTION INTRAMUSCULAR; INTRAVENOUS; SUBCUTANEOUS at 04:59

## 2023-05-10 RX ADMIN — ROPIVACAINE HYDROCHLORIDE 60 ML: 5 INJECTION EPIDURAL; INFILTRATION; PERINEURAL at 09:06

## 2023-05-10 RX ADMIN — HYDROMORPHONE HYDROCHLORIDE 0.5 MG: 1 INJECTION, SOLUTION INTRAMUSCULAR; INTRAVENOUS; SUBCUTANEOUS at 20:07

## 2023-05-10 RX ADMIN — QUETIAPINE FUMARATE 100 MG: 25 TABLET ORAL at 22:52

## 2023-05-10 RX ADMIN — HYDROMORPHONE HYDROCHLORIDE 0.5 MG: 1 INJECTION, SOLUTION INTRAMUSCULAR; INTRAVENOUS; SUBCUTANEOUS at 01:56

## 2023-05-10 RX ADMIN — ROPIVACAINE HYDROCHLORIDE 30 ML: 5 INJECTION, SOLUTION EPIDURAL; INFILTRATION; PERINEURAL at 09:02

## 2023-05-10 RX ADMIN — ONDANSETRON HYDROCHLORIDE 4 MG: 2 SOLUTION INTRAMUSCULAR; INTRAVENOUS at 09:23

## 2023-05-10 RX ADMIN — WATER 1000 MG: 1 INJECTION INTRAMUSCULAR; INTRAVENOUS; SUBCUTANEOUS at 16:14

## 2023-05-10 RX ADMIN — DEXAMETHASONE SODIUM PHOSPHATE 4 MG: 10 INJECTION, SOLUTION INTRAMUSCULAR; INTRAVENOUS at 09:06

## 2023-05-10 RX ADMIN — HYDROMORPHONE HYDROCHLORIDE 0.5 MG: 1 INJECTION, SOLUTION INTRAMUSCULAR; INTRAVENOUS; SUBCUTANEOUS at 12:02

## 2023-05-10 RX ADMIN — MIDAZOLAM 2 MG: 1 INJECTION INTRAMUSCULAR; INTRAVENOUS at 09:00

## 2023-05-10 RX ADMIN — PROPOFOL 200 MG: 10 INJECTION, EMULSION INTRAVENOUS at 09:23

## 2023-05-10 RX ADMIN — FENTANYL CITRATE 100 MCG: 50 INJECTION, SOLUTION INTRAMUSCULAR; INTRAVENOUS at 09:46

## 2023-05-10 RX ADMIN — HYDROMORPHONE HYDROCHLORIDE 0.5 MG: 1 INJECTION, SOLUTION INTRAMUSCULAR; INTRAVENOUS; SUBCUTANEOUS at 16:11

## 2023-05-10 RX ADMIN — ACETAMINOPHEN 650 MG: 325 TABLET, FILM COATED ORAL at 16:14

## 2023-05-10 RX ADMIN — SODIUM CHLORIDE: 9 INJECTION, SOLUTION INTRAVENOUS at 16:17

## 2023-05-10 RX ADMIN — POLYETHYLENE GLYCOL 3350 17 G: 17 POWDER, FOR SOLUTION ORAL at 16:30

## 2023-05-10 RX ADMIN — DEXAMETHASONE SODIUM PHOSPHATE 10 MG: 10 INJECTION INTRAMUSCULAR; INTRAVENOUS at 09:23

## 2023-05-10 RX ADMIN — OXYCODONE HYDROCHLORIDE 10 MG: 10 TABLET ORAL at 18:46

## 2023-05-10 RX ADMIN — MEPERIDINE HYDROCHLORIDE 12.5 MG: 25 INJECTION, SOLUTION INTRAMUSCULAR; INTRAVENOUS; SUBCUTANEOUS at 11:16

## 2023-05-10 RX ADMIN — SODIUM CHLORIDE: 9 INJECTION, SOLUTION INTRAVENOUS at 09:09

## 2023-05-10 RX ADMIN — OXYCODONE HYDROCHLORIDE 10 MG: 10 TABLET ORAL at 22:52

## 2023-05-10 RX ADMIN — FENTANYL CITRATE 100 MCG: 50 INJECTION, SOLUTION INTRAMUSCULAR; INTRAVENOUS at 09:23

## 2023-05-10 RX ADMIN — CEFAZOLIN 2000 MG: 2 INJECTION, POWDER, FOR SOLUTION INTRAMUSCULAR; INTRAVENOUS at 09:30

## 2023-05-10 RX ADMIN — ACETAMINOPHEN 650 MG: 325 TABLET, FILM COATED ORAL at 14:21

## 2023-05-10 RX ADMIN — SODIUM CHLORIDE, PRESERVATIVE FREE 10 ML: 5 INJECTION INTRAVENOUS at 16:13

## 2023-05-10 RX ADMIN — ACETAMINOPHEN 650 MG: 325 TABLET, FILM COATED ORAL at 22:54

## 2023-05-10 RX ADMIN — OXYCODONE HYDROCHLORIDE 10 MG: 10 TABLET ORAL at 04:58

## 2023-05-10 RX ADMIN — SODIUM CHLORIDE, PRESERVATIVE FREE 10 ML: 5 INJECTION INTRAVENOUS at 16:22

## 2023-05-10 ASSESSMENT — PAIN DESCRIPTION - PAIN TYPE
TYPE: SURGICAL PAIN
TYPE: SURGICAL PAIN

## 2023-05-10 ASSESSMENT — PAIN DESCRIPTION - DESCRIPTORS
DESCRIPTORS: THROBBING
DESCRIPTORS: ACHING;DISCOMFORT
DESCRIPTORS: ACHING
DESCRIPTORS: DISCOMFORT
DESCRIPTORS: ACHING;DISCOMFORT;NAGGING
DESCRIPTORS: ACHING;DISCOMFORT
DESCRIPTORS: ACHING;DISCOMFORT;THROBBING
DESCRIPTORS: THROBBING

## 2023-05-10 ASSESSMENT — PAIN SCALES - GENERAL
PAINLEVEL_OUTOF10: 6
PAINLEVEL_OUTOF10: 1
PAINLEVEL_OUTOF10: 8
PAINLEVEL_OUTOF10: 9
PAINLEVEL_OUTOF10: 0
PAINLEVEL_OUTOF10: 10
PAINLEVEL_OUTOF10: 5
PAINLEVEL_OUTOF10: 7
PAINLEVEL_OUTOF10: 1

## 2023-05-10 ASSESSMENT — PAIN - FUNCTIONAL ASSESSMENT
PAIN_FUNCTIONAL_ASSESSMENT: PREVENTS OR INTERFERES SOME ACTIVE ACTIVITIES AND ADLS
PAIN_FUNCTIONAL_ASSESSMENT: PREVENTS OR INTERFERES SOME ACTIVE ACTIVITIES AND ADLS

## 2023-05-10 ASSESSMENT — PAIN DESCRIPTION - LOCATION
LOCATION: ANKLE
LOCATION: ANKLE
LOCATION: FOOT
LOCATION: ANKLE
LOCATION: LEG
LOCATION: LEG;FOOT;ANKLE
LOCATION: ANKLE
LOCATION: HEAD

## 2023-05-10 ASSESSMENT — PAIN DESCRIPTION - ORIENTATION
ORIENTATION: RIGHT

## 2023-05-10 ASSESSMENT — PAIN DESCRIPTION - FREQUENCY: FREQUENCY: CONTINUOUS

## 2023-05-10 ASSESSMENT — PAIN DESCRIPTION - ONSET: ONSET: ON-GOING

## 2023-05-10 NOTE — OP NOTE
Operative Note      Patient: George Kemp  YOB: 1985  MRN: [id number]    Date of Procedure: 5/10/2023    Pre-Op Diagnosis Codes:  Closed homolateral dislocations of 1-5 metatarsals. Fractures of medial, middle, lateral cuneiforms. Fractures of base of second metatarsal.      Post-Op Diagnosis: Same       Procedure(s):  Open reduction second tarsometatarsal/lisfranc joint with screw fixation. 2.  Closed reduction with percutaneous screw fixation first TMT joint. 3. Closed reduction with manipulation of third fourth and fifth tarsometatarsal joint dislocations  4. Closed treatment of medial, middle lateral cuneiform fractures. 5.  Closed treatment base of second metatarsal fracture. Surgeon(s):  Clover Cortes DO    Assistant:   Resident: Jame Romero DO; Marylene Maas, DO; Judith Figueroa DO    Anesthesia: General    Estimated Blood Loss (mL): less than 50     Complications: None    Specimens:   * No specimens in log *    Implants:  Implant Name Type Inv. Item Serial No.  Lot No. LRB No. Used Action   SCREW BNE L40MM DIA4MM S STL LAYLA SHT 1/3 THRD SM HEX SOCK - [id number]  SCREW BNE L40MM DIA4MM S STL LAYLA SHT 1/3 THRD SM HEX SOCK  [employer]  Right 1 Implanted   SCREW BNE L56MM DIA4MM CANC S STL ST SELF DRL LAYLA SHT THRD - [id number]  SCREW BNE L56MM DIA4MM CANC S STL ST SELF DRL LAYLA SHT THRD  [employer]-  Right 1 Implanted         Drains: * No LDAs found *    Findings: Homolateral dislocation metatarsals 1-5. Unstable first TMT. Detailed Description of Procedure:     HISTORY: The patient is a 45y.o. year old male with history of above preop diagnosis. I explained the risk, benefits, expected outcome, and alternatives to the procedure. Patient understands and is in agreement.     OPERATIVE COURSE:  The patient was seen and identified outside the operative suite, in which the operative site was marked as appropriate by patient, surgeon,

## 2023-05-10 NOTE — ANESTHESIA PROCEDURE NOTES
Peripheral Block    Patient location during procedure: pre-op  Reason for block: post-op pain management and at surgeon's request  Start time: 5/10/2023 9:02 AM  End time: 5/10/2023 9:04 AM  Staffing  Performed: anesthesiologist   Anesthesiologist: Shannon Escobedo,   Preanesthetic Checklist  Completed: patient identified, IV checked, site marked, risks and benefits discussed, surgical/procedural consents, equipment checked, pre-op evaluation, timeout performed, anesthesia consent given, oxygen available and monitors applied/VS acknowledged  Peripheral Block   Patient position: supine  Prep: ChloraPrep  Provider prep: mask and sterile gloves  Patient monitoring: cardiac monitor, continuous pulse ox, frequent blood pressure checks and IV access  Block type: Sciatic  Popliteal  Laterality: right  Injection technique: single-shot  Guidance: ultrasound guided  Local infiltration: lidocaine  Infiltration strength: 1 %  Local infiltration: lidocaine  Dose: 3 mL    Needle   Needle gauge: 21 G  Needle localization: ultrasound guidance  Needle length: 10 cm  Assessment   Injection assessment: negative aspiration for heme, no paresthesia on injection and local visualized surrounding nerve on ultrasound  Paresthesia pain: none  Slow fractionated injection: yes  Hemodynamics: stable  Real-time US image taken/store: yes  Outcomes: uncomplicated and patient tolerated procedure well    Additional Notes  U/S 63600. Time out performed.          Medications Administered  dexamethasone (DECADRON) injection 4 mg/mL - Perineural   4 mg - 5/10/2023 9:02:00 AM  ropivacaine (NAROPIN) injection 0.5% - Perineural   30 mL - 5/10/2023 9:02:00 AM

## 2023-05-10 NOTE — ANESTHESIA PROCEDURE NOTES
Peripheral Block    Patient location during procedure: pre-op  Reason for block: post-op pain management and at surgeon's request  Start time: 5/10/2023 9:03 AM  End time: 5/10/2023 9:05 AM  Staffing  Performed: anesthesiologist   Anesthesiologist: Gabrielle Rojas DO  Preanesthetic Checklist  Completed: patient identified, IV checked, site marked, risks and benefits discussed, surgical/procedural consents, equipment checked, pre-op evaluation, timeout performed, anesthesia consent given, oxygen available and monitors applied/VS acknowledged  Peripheral Block   Patient position: supine  Prep: alcohol swabs  Provider prep: mask and sterile gloves  Patient monitoring: cardiac monitor, continuous pulse ox, frequent blood pressure checks and IV access  Block type: Saphenous  Laterality: right  Injection technique: single-shot  Guidance: ultrasound guided  Local infiltration: ropivacaine  Local infiltration: ropivacaine    Needle   Needle type: combined needle/nerve stimulator   Needle gauge: 22 G  Needle localization: ultrasound guidance  Needle length: 5 cm  Assessment   Injection assessment: negative aspiration for heme, no paresthesia on injection and local visualized surrounding nerve on ultrasound  Paresthesia pain: none  Slow fractionated injection: yes  Hemodynamics: stable  Real-time US image taken/store: yes  Outcomes: uncomplicated and patient tolerated procedure well    Additional Notes  Timeout performed  Medications Administered  ropivacaine (NAROPIN) injection 0.5% - Perineural   30 mL - 5/10/2023 9:03:00 AM

## 2023-05-10 NOTE — ANESTHESIA POSTPROCEDURE EVALUATION
Department of Anesthesiology  Postprocedure Note    Patient: Candi Patterson  MRN: [id number]  YOB: 1985  Date of evaluation: 5/10/2023      Procedure Summary     Date: 05/10/23 Room / Location: [facility] OR 09 / [facility]    Anesthesia Start: 8680 Anesthesia Stop: 1106    Procedure: CLOSED REDUCTION PERCUTANEOUS SCREW FIXATION OF RIGHT FOOT LISFRANC INJURY (Right: Ankle) Diagnosis:       Lisfranc's dislocation, right, initial encounter      (Lisfranc's dislocation, right, initial encounter [Z82.590C])    Surgeons: Derrek Moran DO Responsible Provider: Annette Gay DO    Anesthesia Type: general, regional ASA Status: 1          Anesthesia Type: No value filed.     Dejon Phase I:      Dejon Phase II:        Anesthesia Post Evaluation    Patient location during evaluation: PACU  Patient participation: complete - patient participated  Level of consciousness: awake and alert  Airway patency: patent  Nausea & Vomiting: no nausea and no vomiting  Complications: no  Cardiovascular status: blood pressure returned to baseline  Respiratory status: acceptable  Hydration status: euvolemic  Multimodal analgesia pain management approach

## 2023-05-10 NOTE — H&P
oxyCODONE HCl (OXY-IR) immediate release tablet 10 mg, 10 mg, Oral, Q4H PRN  Allergies:  Patient has no known allergies. Social History:   TOBACCO:   reports that he has been smoking cigarettes. He has been smoking an average of 1 pack per day. He does not have any smokeless tobacco history on file. ETOH:   has no history on file for alcohol use. DRUGS:   has no history on file for drug use. ACTIVITIES OF DAILY LIVING:    OCCUPATION:    Family History:   History reviewed. No pertinent family history. REVIEW OF SYSTEMS:  CONSTITUTIONAL:  negative for  fevers, chills  EYES:  negative for blurred vision, visual disturbance  HEENT:  negative for  hearing loss, voice change  RESPIRATORY:  negative for  dyspnea, wheezing  CARDIOVASCULAR:  negative for  chest pain, palpitations  GASTROINTESTINAL:  negative for nausea, vomiting  GENITOURINARY:  negative for frequency, urinary incontinence  HEMATOLOGIC/LYMPHATIC:  negative for bleeding and petechiae  MUSCULOSKELETAL:  positive for  pain swelling to the right foot  NEUROLOGICAL:  negative for headaches, dizziness  BEHAVIOR/PSYCH:  negative for increased agitation and anxiety    PHYSICAL EXAM:    VITALS:  /79   Pulse 70   Temp 97.6 °F (36.4 °C)   Resp 18   Ht 6' (1.829 m)   Wt 210 lb (95.3 kg)   SpO2 97%   BMI 28.48 kg/m²   CONSTITUTIONAL:  awake, alert, cooperative, no apparent distress, and appears stated age  General appearance:  No apparent distress, appears stated age. HEENT:  Normal cephalic, atraumatic without obvious deformity. Pupils equal, round, and reactive to light. Extra ocular muscles intact. Conjunctivae/corneas clear. Neck: Supple, with full range of motion. No jugular venous distention. Trachea midline. Respiratory:  Normal respiratory effort. Clear to auscultation,   Cardiovascular:  RRR  Abdomen: Soft, non-tender, non-distended with normal bowel sounds.    Skin: Please see exam below  Psychiatric:  Alert and oriented x 3

## 2023-05-10 NOTE — ANESTHESIA PRE PROCEDURE
PRN Carter Romana, DO        polyethylene glycol (GLYCOLAX) packet 17 g  17 g Oral Daily Carter Valerahal, DO        acetaminophen (TYLENOL) tablet 650 mg  650 mg Oral 4 times per day Carter Avendano, DO   650 mg at 05/09/23 2316    meloxicam (MOBIC) tablet 7.5 mg  7.5 mg Oral Daily Carter Valerahal, DO        oxyCODONE (ROXICODONE) immediate release tablet 5 mg  5 mg Oral Q4H PRN Carter Avendano, DO        Or    oxyCODONE HCl (OXY-IR) immediate release tablet 10 mg  10 mg Oral Q4H PRN Carter Valerahal, DO   10 mg at 05/10/23 2907     Current Outpatient Medications   Medication Sig Dispense Refill    QUEtiapine (SEROQUEL) 100 MG tablet Take 1 tablet by mouth 2 times daily      albuterol sulfate HFA (VENTOLIN HFA) 108 (90 Base) MCG/ACT inhaler Inhale 2 puffs into the lungs every 6 hours as needed for Wheezing         Allergies:  No Known Allergies    Problem List:    Patient Active Problem List   Diagnosis Code    Bethany castano, right, initial encounter C45.322H       Past Medical History:  History reviewed. No pertinent past medical history. Past Surgical History:  History reviewed. No pertinent surgical history.     Social History:    Social History     Tobacco Use    Smoking status: Every Day     Packs/day: 1.00     Types: Cigarettes    Smokeless tobacco: Not on file   Substance Use Topics    Alcohol use: Not on file                                Ready to quit: Not Answered  Counseling given: Not Answered      Vital Signs (Current):   Vitals:    05/10/23 0802 05/10/23 0900 05/10/23 0904 05/10/23 0905   BP: 136/80 129/76  139/64   Pulse: 88 78 81 61   Resp:  20 15 17   Temp:       SpO2:  97% 96% 96%   Weight:       Height:                                                  BP Readings from Last 3 Encounters:   05/10/23 139/64       NPO Status: Time of last liquid consumption: 1200                        Time of last solid consumption: 0700                        Date of last liquid consumption: 05/09/23

## 2023-05-11 LAB
BASOPHILS # BLD: 0.02 E9/L (ref 0–0.2)
BASOPHILS NFR BLD: 0.2 % (ref 0–2)
EOSINOPHIL # BLD: 0.08 E9/L (ref 0.05–0.5)
EOSINOPHIL NFR BLD: 0.6 % (ref 0–6)
ERYTHROCYTE [DISTWIDTH] IN BLOOD BY AUTOMATED COUNT: 12.9 FL (ref 11.5–15)
HCT VFR BLD AUTO: 41.5 % (ref 37–54)
HGB BLD-MCNC: 14.1 G/DL (ref 12.5–16.5)
IMM GRANULOCYTES # BLD: 0.05 E9/L
IMM GRANULOCYTES NFR BLD: 0.4 % (ref 0–5)
LYMPHOCYTES # BLD: 3.3 E9/L (ref 1.5–4)
LYMPHOCYTES NFR BLD: 25.6 % (ref 20–42)
MCH RBC QN AUTO: 32.2 PG (ref 26–35)
MCHC RBC AUTO-ENTMCNC: 34 % (ref 32–34.5)
MCV RBC AUTO: 94.7 FL (ref 80–99.9)
MONOCYTES # BLD: 1.23 E9/L (ref 0.1–0.95)
MONOCYTES NFR BLD: 9.6 % (ref 2–12)
NEUTROPHILS # BLD: 8.19 E9/L (ref 1.8–7.3)
NEUTS SEG NFR BLD: 63.6 % (ref 43–80)
PLATELET # BLD AUTO: 215 E9/L (ref 130–450)
PMV BLD AUTO: 9.8 FL (ref 7–12)
RBC # BLD AUTO: 4.38 E12/L (ref 3.8–5.8)
WBC # BLD: 12.9 E9/L (ref 4.5–11.5)

## 2023-05-11 PROCEDURE — 2580000003 HC RX 258: Performed by: STUDENT IN AN ORGANIZED HEALTH CARE EDUCATION/TRAINING PROGRAM

## 2023-05-11 PROCEDURE — 97530 THERAPEUTIC ACTIVITIES: CPT

## 2023-05-11 PROCEDURE — 2500000003 HC RX 250 WO HCPCS: Performed by: STUDENT IN AN ORGANIZED HEALTH CARE EDUCATION/TRAINING PROGRAM

## 2023-05-11 PROCEDURE — 6370000000 HC RX 637 (ALT 250 FOR IP): Performed by: ORTHOPAEDIC SURGERY

## 2023-05-11 PROCEDURE — 36415 COLL VENOUS BLD VENIPUNCTURE: CPT

## 2023-05-11 PROCEDURE — 6370000000 HC RX 637 (ALT 250 FOR IP): Performed by: STUDENT IN AN ORGANIZED HEALTH CARE EDUCATION/TRAINING PROGRAM

## 2023-05-11 PROCEDURE — 1200000000 HC SEMI PRIVATE

## 2023-05-11 PROCEDURE — 97116 GAIT TRAINING THERAPY: CPT

## 2023-05-11 PROCEDURE — 2580000003 HC RX 258

## 2023-05-11 PROCEDURE — 97167 OT EVAL HIGH COMPLEX 60 MIN: CPT

## 2023-05-11 PROCEDURE — 97535 SELF CARE MNGMENT TRAINING: CPT

## 2023-05-11 PROCEDURE — 6360000002 HC RX W HCPCS: Performed by: STUDENT IN AN ORGANIZED HEALTH CARE EDUCATION/TRAINING PROGRAM

## 2023-05-11 PROCEDURE — 97161 PT EVAL LOW COMPLEX 20 MIN: CPT

## 2023-05-11 PROCEDURE — 85025 COMPLETE CBC W/AUTO DIFF WBC: CPT

## 2023-05-11 RX ADMIN — HYDROMORPHONE HYDROCHLORIDE 0.5 MG: 1 INJECTION, SOLUTION INTRAMUSCULAR; INTRAVENOUS; SUBCUTANEOUS at 11:49

## 2023-05-11 RX ADMIN — ACETAMINOPHEN 650 MG: 325 TABLET, FILM COATED ORAL at 13:31

## 2023-05-11 RX ADMIN — HYDROMORPHONE HYDROCHLORIDE 0.5 MG: 1 INJECTION, SOLUTION INTRAMUSCULAR; INTRAVENOUS; SUBCUTANEOUS at 01:06

## 2023-05-11 RX ADMIN — ACETAMINOPHEN 650 MG: 325 TABLET, FILM COATED ORAL at 18:21

## 2023-05-11 RX ADMIN — OXYCODONE HYDROCHLORIDE 10 MG: 10 TABLET ORAL at 16:28

## 2023-05-11 RX ADMIN — SODIUM CHLORIDE, PRESERVATIVE FREE 10 ML: 5 INJECTION INTRAVENOUS at 20:40

## 2023-05-11 RX ADMIN — OXYCODONE HYDROCHLORIDE 10 MG: 10 TABLET ORAL at 06:33

## 2023-05-11 RX ADMIN — OXYCODONE HYDROCHLORIDE 10 MG: 10 TABLET ORAL at 20:26

## 2023-05-11 RX ADMIN — ASPIRIN 325 MG: 325 TABLET, COATED ORAL at 20:40

## 2023-05-11 RX ADMIN — HYDROMORPHONE HYDROCHLORIDE 0.5 MG: 1 INJECTION, SOLUTION INTRAMUSCULAR; INTRAVENOUS; SUBCUTANEOUS at 22:12

## 2023-05-11 RX ADMIN — SODIUM CHLORIDE, PRESERVATIVE FREE 10 ML: 5 INJECTION INTRAVENOUS at 11:53

## 2023-05-11 RX ADMIN — SODIUM CHLORIDE, PRESERVATIVE FREE 10 ML: 5 INJECTION INTRAVENOUS at 08:47

## 2023-05-11 RX ADMIN — HYDROMORPHONE HYDROCHLORIDE 0.5 MG: 1 INJECTION, SOLUTION INTRAMUSCULAR; INTRAVENOUS; SUBCUTANEOUS at 15:10

## 2023-05-11 RX ADMIN — HYDROMORPHONE HYDROCHLORIDE 0.5 MG: 1 INJECTION, SOLUTION INTRAMUSCULAR; INTRAVENOUS; SUBCUTANEOUS at 18:20

## 2023-05-11 RX ADMIN — SODIUM CHLORIDE, PRESERVATIVE FREE 10 ML: 5 INJECTION INTRAVENOUS at 15:10

## 2023-05-11 RX ADMIN — WATER 1000 MG: 1 INJECTION INTRAMUSCULAR; INTRAVENOUS; SUBCUTANEOUS at 01:02

## 2023-05-11 RX ADMIN — HYDROMORPHONE HYDROCHLORIDE 0.5 MG: 1 INJECTION, SOLUTION INTRAMUSCULAR; INTRAVENOUS; SUBCUTANEOUS at 05:17

## 2023-05-11 RX ADMIN — ASPIRIN 325 MG: 325 TABLET, COATED ORAL at 08:49

## 2023-05-11 RX ADMIN — MELOXICAM 7.5 MG: 7.5 TABLET ORAL at 08:49

## 2023-05-11 RX ADMIN — HYDROMORPHONE HYDROCHLORIDE 0.5 MG: 1 INJECTION, SOLUTION INTRAMUSCULAR; INTRAVENOUS; SUBCUTANEOUS at 08:43

## 2023-05-11 RX ADMIN — QUETIAPINE FUMARATE 100 MG: 25 TABLET ORAL at 20:26

## 2023-05-11 RX ADMIN — ACETAMINOPHEN 650 MG: 325 TABLET, FILM COATED ORAL at 05:13

## 2023-05-11 RX ADMIN — OXYCODONE HYDROCHLORIDE 10 MG: 10 TABLET ORAL at 10:26

## 2023-05-11 ASSESSMENT — PAIN SCALES - GENERAL
PAINLEVEL_OUTOF10: 0
PAINLEVEL_OUTOF10: 4
PAINLEVEL_OUTOF10: 9
PAINLEVEL_OUTOF10: 8
PAINLEVEL_OUTOF10: 7
PAINLEVEL_OUTOF10: 6
PAINLEVEL_OUTOF10: 8
PAINLEVEL_OUTOF10: 4
PAINLEVEL_OUTOF10: 3
PAINLEVEL_OUTOF10: 6
PAINLEVEL_OUTOF10: 6
PAINLEVEL_OUTOF10: 9
PAINLEVEL_OUTOF10: 8

## 2023-05-11 ASSESSMENT — PAIN DESCRIPTION - DESCRIPTORS
DESCRIPTORS: THROBBING;ACHING;DISCOMFORT
DESCRIPTORS: SHARP;SHOOTING;STABBING
DESCRIPTORS: ACHING;SHARP;SHOOTING;THROBBING
DESCRIPTORS: ACHING;DISCOMFORT;THROBBING
DESCRIPTORS: ACHING;DISCOMFORT;THROBBING
DESCRIPTORS: ACHING;SHARP;SHOOTING
DESCRIPTORS: ACHING;SHARP;SHOOTING;STABBING
DESCRIPTORS: ACHING;SHOOTING;THROBBING

## 2023-05-11 ASSESSMENT — PAIN - FUNCTIONAL ASSESSMENT
PAIN_FUNCTIONAL_ASSESSMENT: PREVENTS OR INTERFERES SOME ACTIVE ACTIVITIES AND ADLS
PAIN_FUNCTIONAL_ASSESSMENT: ACTIVITIES ARE NOT PREVENTED
PAIN_FUNCTIONAL_ASSESSMENT: PREVENTS OR INTERFERES SOME ACTIVE ACTIVITIES AND ADLS

## 2023-05-11 ASSESSMENT — PAIN DESCRIPTION - LOCATION
LOCATION: LEG
LOCATION: ANKLE
LOCATION: LEG
LOCATION: ANKLE

## 2023-05-11 ASSESSMENT — PAIN DESCRIPTION - PAIN TYPE
TYPE: SURGICAL PAIN

## 2023-05-11 ASSESSMENT — PAIN DESCRIPTION - FREQUENCY
FREQUENCY: CONTINUOUS

## 2023-05-11 ASSESSMENT — PAIN DESCRIPTION - ORIENTATION
ORIENTATION: RIGHT

## 2023-05-11 ASSESSMENT — PAIN DESCRIPTION - ONSET
ONSET: ON-GOING
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
## 2023-05-12 VITALS
BODY MASS INDEX: 28.44 KG/M2 | DIASTOLIC BLOOD PRESSURE: 57 MMHG | WEIGHT: 210 LBS | OXYGEN SATURATION: 94 % | TEMPERATURE: 96.4 F | SYSTOLIC BLOOD PRESSURE: 125 MMHG | HEIGHT: 72 IN | HEART RATE: 94 BPM | RESPIRATION RATE: 16 BRPM

## 2023-05-12 LAB
BASOPHILS # BLD: 0.03 E9/L (ref 0–0.2)
BASOPHILS NFR BLD: 0.4 % (ref 0–2)
EOSINOPHIL # BLD: 0.18 E9/L (ref 0.05–0.5)
EOSINOPHIL NFR BLD: 2.5 % (ref 0–6)
ERYTHROCYTE [DISTWIDTH] IN BLOOD BY AUTOMATED COUNT: 13.2 FL (ref 11.5–15)
HCT VFR BLD AUTO: 42 % (ref 37–54)
HGB BLD-MCNC: 13.8 G/DL (ref 12.5–16.5)
IMM GRANULOCYTES # BLD: 0.03 E9/L
IMM GRANULOCYTES NFR BLD: 0.4 % (ref 0–5)
LYMPHOCYTES # BLD: 3.52 E9/L (ref 1.5–4)
LYMPHOCYTES NFR BLD: 48 % (ref 20–42)
MCH RBC QN AUTO: 31.7 PG (ref 26–35)
MCHC RBC AUTO-ENTMCNC: 32.9 % (ref 32–34.5)
MCV RBC AUTO: 96.3 FL (ref 80–99.9)
MONOCYTES # BLD: 0.59 E9/L (ref 0.1–0.95)
MONOCYTES NFR BLD: 8 % (ref 2–12)
NEUTROPHILS # BLD: 2.98 E9/L (ref 1.8–7.3)
NEUTS SEG NFR BLD: 40.7 % (ref 43–80)
PLATELET # BLD AUTO: 195 E9/L (ref 130–450)
PMV BLD AUTO: 9.6 FL (ref 7–12)
RBC # BLD AUTO: 4.36 E12/L (ref 3.8–5.8)
WBC # BLD: 7.3 E9/L (ref 4.5–11.5)

## 2023-05-12 PROCEDURE — 2500000003 HC RX 250 WO HCPCS: Performed by: STUDENT IN AN ORGANIZED HEALTH CARE EDUCATION/TRAINING PROGRAM

## 2023-05-12 PROCEDURE — 2580000003 HC RX 258

## 2023-05-12 PROCEDURE — 36415 COLL VENOUS BLD VENIPUNCTURE: CPT

## 2023-05-12 PROCEDURE — 97530 THERAPEUTIC ACTIVITIES: CPT

## 2023-05-12 PROCEDURE — 85025 COMPLETE CBC W/AUTO DIFF WBC: CPT

## 2023-05-12 PROCEDURE — 97535 SELF CARE MNGMENT TRAINING: CPT

## 2023-05-12 PROCEDURE — 6370000000 HC RX 637 (ALT 250 FOR IP): Performed by: STUDENT IN AN ORGANIZED HEALTH CARE EDUCATION/TRAINING PROGRAM

## 2023-05-12 RX ORDER — IBUPROFEN 600 MG/1
600 TABLET ORAL 3 TIMES DAILY PRN
Qty: 30 TABLET | Refills: 0 | Status: SHIPPED | OUTPATIENT
Start: 2023-05-12 | End: 2023-05-27

## 2023-05-12 RX ORDER — POLYETHYLENE GLYCOL 3350 17 G/17G
17 POWDER, FOR SOLUTION ORAL PRN
Qty: 1530 G | Refills: 0 | Status: SHIPPED | OUTPATIENT
Start: 2023-05-12 | End: 2023-06-11

## 2023-05-12 RX ADMIN — HYDROMORPHONE HYDROCHLORIDE 0.5 MG: 1 INJECTION, SOLUTION INTRAMUSCULAR; INTRAVENOUS; SUBCUTANEOUS at 09:24

## 2023-05-12 RX ADMIN — ASPIRIN 325 MG: 325 TABLET, COATED ORAL at 09:24

## 2023-05-12 RX ADMIN — ACETAMINOPHEN 650 MG: 325 TABLET, FILM COATED ORAL at 12:37

## 2023-05-12 RX ADMIN — HYDROMORPHONE HYDROCHLORIDE 0.5 MG: 1 INJECTION, SOLUTION INTRAMUSCULAR; INTRAVENOUS; SUBCUTANEOUS at 12:37

## 2023-05-12 RX ADMIN — SODIUM CHLORIDE, PRESERVATIVE FREE 10 ML: 5 INJECTION INTRAVENOUS at 12:39

## 2023-05-12 RX ADMIN — MELOXICAM 7.5 MG: 7.5 TABLET ORAL at 10:51

## 2023-05-12 RX ADMIN — ACETAMINOPHEN 650 MG: 325 TABLET, FILM COATED ORAL at 00:24

## 2023-05-12 RX ADMIN — OXYCODONE HYDROCHLORIDE 10 MG: 10 TABLET ORAL at 10:51

## 2023-05-12 RX ADMIN — SODIUM CHLORIDE, PRESERVATIVE FREE 10 ML: 5 INJECTION INTRAVENOUS at 09:28

## 2023-05-12 RX ADMIN — ACETAMINOPHEN 650 MG: 325 TABLET, FILM COATED ORAL at 05:38

## 2023-05-12 RX ADMIN — OXYCODONE HYDROCHLORIDE 5 MG: 5 TABLET ORAL at 15:03

## 2023-05-12 RX ADMIN — OXYCODONE HYDROCHLORIDE 10 MG: 10 TABLET ORAL at 05:39

## 2023-05-12 RX ADMIN — HYDROMORPHONE HYDROCHLORIDE 0.5 MG: 1 INJECTION, SOLUTION INTRAMUSCULAR; INTRAVENOUS; SUBCUTANEOUS at 05:43

## 2023-05-12 ASSESSMENT — PAIN SCALES - GENERAL
PAINLEVEL_OUTOF10: 4
PAINLEVEL_OUTOF10: 7
PAINLEVEL_OUTOF10: 4
PAINLEVEL_OUTOF10: 5
PAINLEVEL_OUTOF10: 9
PAINLEVEL_OUTOF10: 6
PAINLEVEL_OUTOF10: 5
PAINLEVEL_OUTOF10: 7
PAINLEVEL_OUTOF10: 7

## 2023-05-12 ASSESSMENT — PAIN - FUNCTIONAL ASSESSMENT
PAIN_FUNCTIONAL_ASSESSMENT: PREVENTS OR INTERFERES SOME ACTIVE ACTIVITIES AND ADLS
PAIN_FUNCTIONAL_ASSESSMENT: PREVENTS OR INTERFERES SOME ACTIVE ACTIVITIES AND ADLS
PAIN_FUNCTIONAL_ASSESSMENT: ACTIVITIES ARE NOT PREVENTED
PAIN_FUNCTIONAL_ASSESSMENT: PREVENTS OR INTERFERES SOME ACTIVE ACTIVITIES AND ADLS
PAIN_FUNCTIONAL_ASSESSMENT: ACTIVITIES ARE NOT PREVENTED

## 2023-05-12 ASSESSMENT — PAIN DESCRIPTION - DESCRIPTORS
DESCRIPTORS: THROBBING;SORE;ACHING
DESCRIPTORS: SORE;ACHING;DISCOMFORT
DESCRIPTORS: ACHING;STABBING;SHOOTING
DESCRIPTORS: ACHING;SHARP;SHOOTING
DESCRIPTORS: ACHING;SHARP;SHOOTING

## 2023-05-12 ASSESSMENT — PAIN DESCRIPTION - PAIN TYPE
TYPE: SURGICAL PAIN

## 2023-05-12 ASSESSMENT — PAIN DESCRIPTION - ORIENTATION
ORIENTATION: RIGHT

## 2023-05-12 ASSESSMENT — PAIN DESCRIPTION - ONSET
ONSET: ON-GOING
ONSET: GRADUAL
ONSET: ON-GOING

## 2023-05-12 ASSESSMENT — PAIN DESCRIPTION - FREQUENCY
FREQUENCY: CONTINUOUS

## 2023-05-12 ASSESSMENT — PAIN DESCRIPTION - LOCATION
LOCATION: ANKLE
LOCATION: LEG
LOCATION: LEG

## 2023-05-12 NOTE — CARE COORDINATION
Chart reviewed for transition of care at discharge. Discharge order noted. Yusuf Rivas at [facility] notified patient is being discharged and she will deliver the [facility] and shower bench. Patient made aware of this. His girlfriend Margot to transport patient home.  Electronically signed by Mateus Lombardi RN on 5/12/2023 at 10:53 AM

## 2023-05-12 NOTE — PROGRESS NOTES
CLINICAL PHARMACY NOTE: MEDS TO BEDS    Total # of Prescriptions Filled: 2   The following medications were delivered to the patient:  Aspirin 325mg  Oxycod-acet 5-325mg    Additional Documentation:  Delivered to pt
Department of Orthopedic Surgery  Resident Progress Note      SUBJECTIVE  Patient seen and examined at bedside. Continue expressed pain and rates 8/10. Denies NTP. OBJECTIVE    Physical    VITALS:  /79   Pulse 76   Temp 97.6 °F (36.4 °C)   Resp 18   Ht 6' (1.829 m)   Wt 210 lb (95.3 kg)   SpO2 95%   BMI 28.48 kg/m²     MUSCULOSKELETAL:   right lower extremity:  Dressing C/D/I  Dorsal portion of the splint evaluated and modified to excess underlying swelling. Compartments soft and compressible, calf non-tender  Palpable dorsalis pedis and posterior tibialis pulse, brisk cap refill to toes, foot warm and perfused  Sensation intact to light touch in sural/deep peroneal/superficial peroneal/saphenous/posterior tibial nerve distributions to foot/ankle. Demonstrates active ankle plantar/dorsiflexion/great toe extension although weak    Data    CBC:   Lab Results   Component Value Date/Time    WBC 12.6 05/09/2023 06:50 PM    RBC 4.64 05/09/2023 06:50 PM    HGB 14.7 05/09/2023 06:50 PM    HCT 44.0 05/09/2023 06:50 PM    MCV 94.8 05/09/2023 06:50 PM    MCH 31.7 05/09/2023 06:50 PM    MCHC 33.4 05/09/2023 06:50 PM    RDW 13.0 05/09/2023 06:50 PM     05/09/2023 06:50 PM    MPV 9.5 05/09/2023 06:50 PM     PT/INR:    Lab Results   Component Value Date/Time    PROTIME 11.9 05/09/2023 06:50 PM    INR 1.1 05/09/2023 06:50 PM       Labs  No results for input(s): BC, BLOODCULT2 in the last 72 hours. No results for input(s): CXSURG in the last 72 hours. ASSESSMENT  Right foot Lisfranc fracture dislocation. Right knee pain  Left foot pain    PLAN    Nonweightbearing right lower extremity  Patient's right lower extremity continues to display soft and compressible compartments. We will continue current plan with or intervention scheduled later today. Multimodal pain management  Continue n.p.o.   Please keep the right lower extremity significantly elevated to help with swelling  Please keep splint clean
Department of Orthopedic Surgery  Resident Progress Note      SUBJECTIVE  Patient seen and examined at bedside. Patient's continue to have right foot pain, he does state it is slightly better than prior to reduction. Patient denies any numbness or tingling in the right foot. Patient still denies any other new orthopedic issues  OBJECTIVE    Physical    VITALS:  /61   Pulse 70   Temp 97.6 °F (36.4 °C)   Resp 18   Ht 6' (1.829 m)   Wt 210 lb (95.3 kg)   SpO2 96%   BMI 28.48 kg/m²     MUSCULOSKELETAL:   right lower extremity:  Dressing C/D/I, foot is moderately edematous./Swollen  Compartments soft and compressible, calf non-tender  Palpable dorsalis pedis  brisk cap refill to toes, foot warm and perfused  Sensation intact to light touch in sural/deep peroneal/superficial peroneal/saphenous/posterior tibial nerve distributions to foot  Patient is able to demonstrate great toe extension, flexion extension first through fifth digits. Patient still has some tenderness to palpation over the posterior aspect of the left foot however no pain with range of motion, just point tenderness over the dorsal aspect of the left foot, neurovascularly intact. Patient also stated he had some muscle soreness in his left shoulder, from him laying in bed. He has no pain with range of motion. He does have some mild tenderness to palpation over the deltoid musculature laterally.     Data    CBC:   Lab Results   Component Value Date/Time    WBC 10.4 05/10/2023 05:40 AM    RBC 4.40 05/10/2023 05:40 AM    HGB 14.1 05/10/2023 05:40 AM    HCT 41.5 05/10/2023 05:40 AM    MCV 94.3 05/10/2023 05:40 AM    MCH 32.0 05/10/2023 05:40 AM    MCHC 34.0 05/10/2023 05:40 AM    RDW 13.2 05/10/2023 05:40 AM     05/10/2023 05:40 AM    MPV 9.5 05/10/2023 05:40 AM     PT/INR:    Lab Results   Component Value Date/Time    PROTIME 11.9 05/09/2023 06:50 PM    INR 1.1 05/09/2023 06:50 PM       Labs  No results for input(s): BC,
Department of Orthopedic Surgery  Resident Progress Note    Patient seen and examined. Patient states the pain of the right foot is improved when compared to prior surgery however still hurts. . No new complaints. Denies chest pain, shortness of breath, dizziness/lightheadedness. Positive fluctuance    VITALS:  /69   Pulse 68   Temp 97.7 °F (36.5 °C) (Temporal)   Resp 16   Ht 6' (1.829 m)   Wt 210 lb (95.3 kg)   SpO2 97%   BMI 28.48 kg/m²     General: Awake alert oriented x3    MUSCULOSKELETAL:   right lower extremity:  Dressing C/D/I, splint clean dry and intact  Compartments soft and compressible through splint  Patient is unable to flex or extend first through fifth digit this morning    Brisk Cap refill, Toes warm and perfused  Patient does have reported sensation to the deep and superficial peroneal's, tibial nerve distributions, he states that he is lacking sensation to his first digit of the right foot upon palpation this morning. Patient does have notable palpable mass over the right elbow, he states this is chronic, he states he has gout and has had that bump on his right elbow for some time. No changes. CBC:   Lab Results   Component Value Date/Time    WBC 10.4 05/10/2023 05:40 AM    HGB 14.1 05/10/2023 05:40 AM    HCT 41.5 05/10/2023 05:40 AM     05/10/2023 05:40 AM     PT/INR:    Lab Results   Component Value Date/Time    PROTIME 11.9 05/09/2023 06:50 PM    INR 1.1 05/09/2023 06:50 PM       ASSESSMENT  S/P-10  Open reduction second tarsometatarsal/lisfranc joint with screw fixation. Closed reduction with percutaneous screw fixation first TMT joint. Closed reduction with manipulation of third fourth and fifth tarsometatarsal joint dislocations  Closed treatment of medial, middle lateral cuneiform fractures. Closed treatment base of second metatarsal fracture.     PLAN    Patient strict nonweightbearing to right lower extremity  Aggressive ice and elevation  Multimodal
Department of Orthopedic Surgery  Resident Progress Note    Patient seen and examined. Patient states the pain of the right foot is still present however it has seemed to be somewhat yesterday. . No new complaints. Denies chest pain, shortness of breath, dizziness/lightheadedness. Positive fluctuance. Patient states he may be ready go home today. VITALS:  BP (!) 125/57   Pulse 94   Temp (!) 96.4 °F (35.8 °C) (Temporal)   Resp 16   Ht 6' (1.829 m)   Wt 210 lb (95.3 kg)   SpO2 94%   BMI 28.48 kg/m²     General: Awake alert oriented x3    MUSCULOSKELETAL:   right lower extremity:  Dressing C/D/I, splint clean dry and intact  Compartments soft and compressible through splint  Patient is able to flex or extend first through fifth digit this morning    Brisk Cap refill, Toes warm and perfused  Patient has sensation first through fifth digits, deep and superficial peroneal nerve distributions, sural nerve distribution, saphenous nerve distribution, tibial nerve distribution. .      Patient does have notable palpable mass over the right elbow is unchanged once again this is chronic. CBC:   Lab Results   Component Value Date/Time    WBC 7.3 05/12/2023 04:25 AM    HGB 13.8 05/12/2023 04:25 AM    HCT 42.0 05/12/2023 04:25 AM     05/12/2023 04:25 AM     PT/INR:    Lab Results   Component Value Date/Time    PROTIME 11.9 05/09/2023 06:50 PM    INR 1.1 05/09/2023 06:50 PM       ASSESSMENT  S/P-5-  Open reduction second tarsometatarsal/lisfranc joint with screw fixation. Closed reduction with percutaneous screw fixation first TMT joint. Closed reduction with manipulation of third fourth and fifth tarsometatarsal joint dislocations  Closed treatment of medial, middle lateral cuneiform fractures. Closed treatment base of second metatarsal fracture.     PLAN    Patient strict nonweightbearing to right lower extremity  Aggressive ice and elevation  Multimodal pain management  DVT prophylaxis 325 twice
Dr. Mei beltran served in regards to patient requesting something for anxiety and seroquel. Awaiting reply.
Nerve block images sent to PACS, no picture printed.
Occupational Therapy  OCCUPATIONAL THERAPY INITIAL EVALUATION    [facility] [employer] [address] [address]      [id number]                                                Patient Name: Serge Davidson  MRN: [id number]  : 1985  Room: [address] [place] [address] #[id number]    Referring Provider: Camille Black DO  Specific Provider Orders/Date: OT eval and treat 23    Diagnosis: Trauma [T14.90XA]  Right foot pain [M79.671]  Fall from roof, initial encounter [W13. 2XXA]  Lisfranc dislocation, right, initial encounter [S93.324A]   Pt admitted to hospital from [facility] for a fall from 10-15 ft roof. Surgery / Procedure: on 5/10:   1. Open reduction second tarsometatarsal/lisfranc joint with screw fixation. 2.  Closed reduction with percutaneous screw fixation first TMT joint. 3. Closed reduction with manipulation of third fourth and fifth tarsometatarsal joint dislocations  4. Closed treatment of medial, middle lateral cuneiform fractures. 5.  Closed treatment base of second metatarsal fracture. Per CT R foot:  Stable mild lateral translation of 2nd, 3rd, 4th, 5th metatarsals  (homolateral LisFranc injury). Acute comminuted fractures along the anterolateral margins of 1st, 2nd, 3rd  cuneiforms. Pertinent Medical History:  has no past medical history on file.        Precautions:  Fall Risk, NWB R LE, aggressive ice and elevation, mild impulsivity     Assessment of current deficits    [x] Functional mobility  [x]ADLs  [x] Strength               []Cognition    [x] Functional transfers   [x] IADLs         [x] Safety Awareness   [x]Endurance    [] Fine Coordination              [x] Balance      [] Vision/perception   []Sensation     []Gross Motor Coordination  [] ROM  [] Delirium                   [] Motor Control     OT PLAN OF CARE   OT POC based on physician orders, patient diagnosis and results of clinical
Occupational Therapy  OT BEDSIDE TREATMENT NOTE   [facility] [address]  [address] [place] [address]      HZAQ:3/66/6158  Patient Name: Raquel Oscar  MRN: [id number]  : 1985  Room: [address] [place] [address] #[id number]     Referring Provider: Quin Gan DO  Specific Provider Orders/Date: OT eval and treat 23     Diagnosis: Trauma [T14.90XA]  Right foot pain [M79.671]  Fall from roof, initial encounter [W13. 2XXA]  Lisfranc dislocation, right, initial encounter [S93.324A]   Pt admitted to hospital from [facility] for a fall from 10-15 ft roof. Surgery / Procedure: on 5/10:   1. Open reduction second tarsometatarsal/lisfranc joint with screw fixation. 2.  Closed reduction with percutaneous screw fixation first TMT joint. 3. Closed reduction with manipulation of third fourth and fifth tarsometatarsal joint dislocations  4. Closed treatment of medial, middle lateral cuneiform fractures. 5.  Closed treatment base of second metatarsal fracture. Per CT R foot:  Stable mild lateral translation of 2nd, 3rd, 4th, 5th metatarsals  (homolateral LisFranc injury). Acute comminuted fractures along the anterolateral margins of 1st, 2nd, 3rd  cuneiforms. Pertinent Medical History:  has no past medical history on file.          Precautions:  Fall Risk, NWB R LE, aggressive ice and elevation, mild impulsivity      Assessment of current deficits    [x] Functional mobility         [x]ADLs           [x] Strength                  []Cognition    [x] Functional transfers       [x] IADLs         [x] Safety Awareness   [x]Endurance    [] Fine Coordination                      [x] Balance      [] Vision/perception   []Sensation      []Gross Motor Coordination          [] ROM           [] Delirium                   [] Motor Control      OT PLAN OF CARE   OT POC based on physician orders, patient diagnosis and results of clinical
Trauma Tertiary Survey    Admit Date: 5/9/2023  Hospital day 1    CC:  Right foot pain s/p fall from roof    Alcohol pre-screening:  Men: How many times in the past year have you had 5 or more drinks in a day?  none  How much do you drink on a daily basis? Socially    Drug Pre-screening:    How many times in the past year have you used a recreational drug or used a prescription medication for non medical reasons? No    Mood Prescreening:    During the past two weeks, have you been bothered by little interest or pleasure doing things? No  During the past two weeks, have you been bothered by feeling down, depressed or hopeless? No      Scheduled Meds:   sodium chloride flush  5-40 mL IntraVENous 2 times per day    sodium chloride flush  10 mL IntraVENous 2 times per day    polyethylene glycol  17 g Oral Daily    acetaminophen  650 mg Oral 4 times per day    meloxicam  7.5 mg Oral Daily     Continuous Infusions:   sodium chloride      sodium chloride 75 mL/hr at 05/09/23 1630    sodium chloride       PRN Meds:sodium chloride flush, sodium chloride, meperidine, HYDROmorphone, HYDROmorphone, ondansetron, HYDROmorphone **OR** HYDROmorphone, sodium chloride flush, sodium chloride, ondansetron **OR** ondansetron, oxyCODONE **OR** oxyCODONE    Subjective:     Complaining of right foot pain but overall better controlled from admit. Pending ortho OR today. Tolerated diet without nausea/vomiting.     Objective:   Patient Vitals for the past 8 hrs:   BP Pulse Resp SpO2   05/10/23 1330 135/66 74 23 96 %   05/10/23 1315 138/65 70 13 96 %   05/10/23 1300 135/78 76 18 (!) 88 %   05/10/23 1245 136/73 74 17 95 %   05/10/23 1230 (!) 145/79 74 23 93 %   05/10/23 1215 133/71 80 19 91 %   05/10/23 1200 121/75 90 19 97 %   05/10/23 1145 (!) 111/53 84 17 94 %   05/10/23 1130 93/72 93 15 99 %   05/10/23 1124 (!) 111/46 97 24 93 %   05/10/23 1116 (!) 111/46 (!) 106 (!) 31 93 %   05/10/23 1115 (!) 111/46 (!) 105 18 93 %   05/10/23 1100 (!)
X ray at bedside
feet with FWW vs crutches mod I    Stair negotiation: ascended and descended  2x4 steps with one rail + one crutch min A 2x4 steps one HR + one crutch CGA 4 steps with one rail + crutch mod I   ROM BUE:  Refer to OT eval   BLE:  WNL except R ankle      Strength BUE:  Refer to OT eval   BLE:  WNL except R ankle     Balance Sitting EOB:  sup  Dynamic Standing:  min A crutches, SBA FWW  Sitting EOB:  ind  Dynamic Standing:  mod I FWW     Pt is A & O x 4  Sensation:  Pt denies numbness and tingling to extremities  Edema:  none noted     Vitals:  Blood Pressure at rest -- Blood Pressure post session --   Heart Rate at rest -- Heart Rate post session 72 BPM    SPO2 at rest -- SPO2 post session 97% RA     Therapeutic Exercises:  none performed this visit    Patient education  Pt educated on ambulation with NWB status    Patient response to education:   Pt verbalized understanding Pt demonstrated skill Pt requires further education in this area   x x x     ASSESSMENT:    Conditions Requiring Skilled Therapeutic Intervention:    [x]Decreased strength     []Decreased ROM  [x]Decreased functional mobility  [x]Decreased balance   [x]Decreased endurance   []Decreased posture  []Decreased sensation  []Decreased coordination   []Decreased vision  [x]Decreased safety awareness   [x]Increased pain       Comments:  Pt in bed on arrival and agreeable to PT tx. Pt notes increased pain upon transferring RLE to dependent position. Pt provided with crutches in room with increased height and able to ambulate with decreased difficulty. Pt ambulated to stairs, performed stair navigation with crutch + rail and had 1 minor LOB. Pt ambulated back to room, requested to return to bed d/t pain. Pt left with RLE elevated and all needs met, call light in reach.      Treatment:  Patient practiced and was instructed in the following treatment:    Bed mobility: performed with cues for safety awareness and proper hand placement to promote improved
information, gathering information on past medical history/social history and prior level of function, completion of standardized testing/informal observation of tasks, assessment of data and education on plan of care and goals.     CPT codes:  [x] Low Complexity PT evaluation 57159  [] Moderate Complexity PT evaluation 63122  [] High Complexity PT evaluation 55235  [] PT Re-evaluation 41809  [x] Gait training 45454 10 minutes  [] Manual therapy 20349 -- minutes  [x] Therapeutic activities 40097 15 minutes  [] Therapeutic exercises 38905 -- minutes  [] Neuromuscular reeducation 58365 -- minutes     Tavia Base, PT, DPT  [id number]

## 2023-05-12 NOTE — DISCHARGE INSTR - COC
[facility] MED Delivery:[id number]}    Wound Care Documentation and Therapy:  Incision 05/10/23 Ankle Right (Active)   Dressing Status Clean;Dry; Intact 23   Incision Cleansed Cleansed with saline 05/10/23 1400   Dressing/Treatment Xeroform; Pharmaceutical agent (see MAR); Gauze dressing/dressing sponge;ABD pad;Cast padding;Splint; Ace wrap 05/10/23 1400   Drainage Amount None 23 194   Odor None 23 0826   Number of days: 2        Elimination:  Continence: Bowel: {YES / KR:39619}  Bladder: {YES / DV:50958}  Urinary Catheter: {Urinary Catheter:834843583}   Colostomy/Ileostomy/Ileal Conduit: {YES / IE:41257}       Date of Last BM: ***    Intake/Output Summary (Last 24 hours) at 2023 1342  Last data filed at 2023 0924  Gross per 24 hour   Intake 485 ml   Output --   Net 485 ml     I/O last 3 completed shifts: In: 1330 [P.O.:1320;  I.V.:10]  Out: 1050 [Urine:1050]    Safety Concerns:     [facility] Safety Concerns:488524073}    Impairments/Disabilities:      {[facility] Impairments/Disabilities:062172126}    Nutrition Therapy:  Current Nutrition Therapy:   [address] Mauricio [facility] Diet List:260274863}    Routes of Feeding: {[facility] DME Other Feedings:492674629}  Liquids: {Slp liquid thickness:90670}  Daily Fluid Restriction: {[facility] DME Yes amt example:847533417}  Last Modified Barium Swallow with Video (Video Swallowing Test): {Done Not Done DYFB:947554672}    Treatments at the Time of Hospital Discharge:   Respiratory Treatments: ***  Oxygen Therapy:  {Therapy; copd oxygen:57761}  Ventilator:    {[facility] Vent NBWR:732414166}    Rehab Therapies: {THERAPEUTIC INTERVENTION:6153522765}  Weight Bearing Status/Restrictions: {[facility] Weight Bearin}  Other Medical Equipment (for information only, NOT a DME order):  {EQUIPMENT:882346956}  Other Treatments: ***    Patient's personal belongings (please select all that are sent with patient):  {[facility] DME Belongings:279650672}    RN SIGNATURE:  {Esignature:126984601}    CASE

## 2023-05-12 NOTE — PLAN OF CARE
Problem: Discharge Planning  Goal: Discharge to home or other facility with appropriate resources  Outcome: Adequate for Discharge     Problem: Pain  Goal: Verbalizes/displays adequate comfort level or baseline comfort level  Outcome: Adequate for Discharge     Problem: Skin/Tissue Integrity  Goal: Absence of new skin breakdown  Description: 1. Monitor for areas of redness and/or skin breakdown  2. Assess vascular access sites hourly  3. Every 4-6 hours minimum:  Change oxygen saturation probe site  4. Every 4-6 hours:  If on nasal continuous positive airway pressure, respiratory therapy assess nares and determine need for appliance change or resting period.   Outcome: Adequate for Discharge     Problem: Safety - Adult  Goal: Free from fall injury  Outcome: Adequate for Discharge     Problem: ABCDS Injury Assessment  Goal: Absence of physical injury  Outcome: Adequate for Discharge     Problem: Risk for Elopement  Goal: Patient will not exit the unit/facility without proper excort  Outcome: Adequate for Discharge

## 2023-05-15 NOTE — DISCHARGE SUMMARY
Physician Discharge Summary     Patient ID:  Yvonne Gold  [id number]  1 y.o.  1985    Admit date: 5/9/2023    Discharge date and time: 5/12/2023  3:17 PM     Admitting Physician: Dr. Jannet Altamirano DO    Discharge Physician: Dr. Jannet Altamirano DO    Admission Diagnoses: Trauma Lucas Calle  Right foot pain [M79.671]  Fall from roof, initial encounter [W13. 2XXA]  Lisfranc dislocation, right, initial encounter [J45.078H]    Discharge Diagnoses: s/p   Open reduction second tarsometatarsal/lisfranc joint with screw fixation. 2.  Closed reduction with percutaneous screw fixation first TMT joint. 3. Closed reduction with manipulation of third fourth and fifth tarsometatarsal joint dislocations  4. Closed treatment of medial, middle lateral cuneiform fractures. 5.  Closed treatment base of second metatarsal fracture. Admission Condition: stable    Discharged Condition: good    Hospital Course: The patient was admitted from the Emergency room. After examination, review of radiologic studies, and appropriate pre-operative risk assessment, it was recommended by Dr. Jannet Altamirano DO to undergo open reduction internal fixation of the right foot. The patient underwent an uneventful course of right open reduction internal fixation of the right foot by Dr. Jannet Altamirano DO on 5-. The patient was subsequently taken to the PACU and the floor in stable condition. The patient was continued on antibiotics for 24 hours post-operatively as they received a dose of antibiotics pre-operatively as well. The patient was started on DVT prophylaxis and physical therapy with instructions to be NWB.  was consulted for d/c planning. On POD 2, after the patient had made appropriate gains in regaining independent function with physical therapy, the patient was discharged to home in stable condition. Treatments: s/p   1 Open reduction second tarsometatarsal/lisfranc joint with screw fixation.   2.  Closed reduction with percutaneous screw

## 2023-05-25 DIAGNOSIS — S93.324A LISFRANC DISLOCATION, RIGHT, INITIAL ENCOUNTER: Primary | ICD-10-CM

## 2023-05-26 ENCOUNTER — HOSPITAL ENCOUNTER (OUTPATIENT)
Dept: GENERAL RADIOLOGY | Age: 38
End: 2023-05-26
Payer: COMMERCIAL

## 2023-05-26 ENCOUNTER — OFFICE VISIT (OUTPATIENT)
Dept: ORTHOPEDIC SURGERY | Age: 38
End: 2023-05-26
Payer: COMMERCIAL

## 2023-05-26 VITALS — BODY MASS INDEX: 27.77 KG/M2 | WEIGHT: 205 LBS | HEIGHT: 72 IN

## 2023-05-26 DIAGNOSIS — S93.324A LISFRANC DISLOCATION, RIGHT, INITIAL ENCOUNTER: ICD-10-CM

## 2023-05-26 DIAGNOSIS — S93.324D LISFRANC DISLOCATION, RIGHT, SUBSEQUENT ENCOUNTER: Primary | ICD-10-CM

## 2023-05-26 PROCEDURE — L4361 PNEUMA/VAC WALK BOOT PRE OTS: HCPCS | Performed by: ORTHOPAEDIC SURGERY

## 2023-05-26 PROCEDURE — 99213 OFFICE O/P EST LOW 20 MIN: CPT | Performed by: ORTHOPAEDIC SURGERY

## 2023-05-26 PROCEDURE — 73630 X-RAY EXAM OF FOOT: CPT

## 2023-05-26 RX ORDER — IBUPROFEN 800 MG/1
800 TABLET ORAL
Qty: 270 TABLET | Refills: 1 | Status: SHIPPED | OUTPATIENT
Start: 2023-05-26

## 2023-05-26 RX ORDER — ACETAMINOPHEN 500 MG
1000 TABLET ORAL 3 TIMES DAILY
Qty: 180 TABLET | Refills: 0 | Status: SHIPPED | OUTPATIENT
Start: 2023-05-26

## 2023-06-21 DIAGNOSIS — S93.324D LISFRANC DISLOCATION, RIGHT, SUBSEQUENT ENCOUNTER: Primary | ICD-10-CM

## 2023-06-26 ENCOUNTER — OFFICE VISIT (OUTPATIENT)
Dept: ORTHOPEDIC SURGERY | Age: 38
End: 2023-06-26
Payer: COMMERCIAL

## 2023-06-26 ENCOUNTER — HOSPITAL ENCOUNTER (OUTPATIENT)
Dept: GENERAL RADIOLOGY | Age: 38
Discharge: HOME | End: 2023-06-28
Payer: COMMERCIAL

## 2023-06-26 VITALS — WEIGHT: 205 LBS | BODY MASS INDEX: 27.77 KG/M2 | HEIGHT: 72 IN

## 2023-06-26 DIAGNOSIS — S93.324D LISFRANC DISLOCATION, RIGHT, SUBSEQUENT ENCOUNTER: ICD-10-CM

## 2023-06-26 DIAGNOSIS — S93.324D LISFRANC DISLOCATION, RIGHT, SUBSEQUENT ENCOUNTER: Primary | ICD-10-CM

## 2023-06-26 PROCEDURE — 73630 X-RAY EXAM OF FOOT: CPT

## 2023-06-26 PROCEDURE — 99213 OFFICE O/P EST LOW 20 MIN: CPT

## 2023-06-26 PROCEDURE — 99024 POSTOP FOLLOW-UP VISIT: CPT | Performed by: PHYSICIAN ASSISTANT

## 2023-06-26 RX ORDER — ACETAMINOPHEN 500 MG
1000 TABLET ORAL 3 TIMES DAILY
Qty: 180 TABLET | Refills: 0 | Status: SHIPPED | OUTPATIENT
Start: 2023-06-26

## 2023-06-26 RX ORDER — IBUPROFEN 800 MG/1
800 TABLET ORAL
Qty: 270 TABLET | Refills: 1 | Status: SHIPPED | OUTPATIENT
Start: 2023-06-26

## 2023-06-26 RX ORDER — OXYCODONE HYDROCHLORIDE AND ACETAMINOPHEN 5; 325 MG/1; MG/1
1 TABLET ORAL DAILY PRN
Qty: 7 TABLET | Refills: 0 | Status: SHIPPED | OUTPATIENT
Start: 2023-06-26 | End: 2023-07-03

## 2023-08-09 DIAGNOSIS — S93.324D LISFRANC DISLOCATION, RIGHT, SUBSEQUENT ENCOUNTER: Primary | ICD-10-CM

## 2023-08-10 ENCOUNTER — HOSPITAL ENCOUNTER (OUTPATIENT)
Dept: GENERAL RADIOLOGY | Age: 38
Discharge: HOME | End: 2023-08-12
Payer: COMMERCIAL

## 2023-08-10 ENCOUNTER — OFFICE VISIT (OUTPATIENT)
Dept: ORTHOPEDIC SURGERY | Age: 38
End: 2023-08-10
Payer: COMMERCIAL

## 2023-08-10 VITALS — BODY MASS INDEX: 29.12 KG/M2 | HEIGHT: 72 IN | WEIGHT: 215 LBS

## 2023-08-10 DIAGNOSIS — S93.324D LISFRANC DISLOCATION, RIGHT, SUBSEQUENT ENCOUNTER: Primary | ICD-10-CM

## 2023-08-10 DIAGNOSIS — S93.324D LISFRANC DISLOCATION, RIGHT, SUBSEQUENT ENCOUNTER: ICD-10-CM

## 2023-08-10 PROCEDURE — 99212 OFFICE O/P EST SF 10 MIN: CPT

## 2023-08-10 PROCEDURE — 73630 X-RAY EXAM OF FOOT: CPT

## 2023-08-10 RX ORDER — ASPIRIN 325 MG
325 TABLET ORAL 2 TIMES DAILY
Qty: 56 TABLET | Refills: 0 | Status: SHIPPED | OUTPATIENT
Start: 2023-08-10 | End: 2023-09-07

## 2023-08-10 RX ORDER — ACETAMINOPHEN 500 MG
500 TABLET ORAL 4 TIMES DAILY PRN
Qty: 360 TABLET | Refills: 1 | Status: SHIPPED | OUTPATIENT
Start: 2023-08-10

## 2023-08-10 RX ORDER — IBUPROFEN 800 MG/1
800 TABLET ORAL EVERY 8 HOURS PRN
Qty: 60 TABLET | Refills: 5 | Status: SHIPPED | OUTPATIENT
Start: 2023-08-10

## 2023-08-10 NOTE — PROGRESS NOTES
Chief Complaint   Patient presents with    Post-Op Check     [facility] 12 week po closed reduction perc and screw fixation Rt foot 5/10/23. Patient compliant with use of the boot. Using crutches at times. No therapy as of yet. OP:SURGEON: Dr. Kacie Kyle, DO  DATE OF PROCEDURE: 5/10/23  PROCEDURE:  Right foot open reduction second tarsometatarsal/lisfranc joint with screw fixation. 2.  Right foot closed reduction with percutaneous screw fixation first TMT joint. 3.  Right foot closed reduction with manipulation of third fourth and fifth tarsometatarsal joint dislocations  4. Right foot closed treatment of medial, middle lateral cuneiform fractures. 5.  Right foot closed treatment base of second metatarsal fracture. POD: 3 months    Subjective:  Rogelio Montes is following up from the above surgery. He is NWB on right lower extremity, although he does admit to applying weight to his right lower extremity without the boot a couple of times. He ambulates with assistive device, crutches. Pain to extremity is mild and is controlled with ibuprofen and tylenol. They denies numbness, tingling to the right lower extremity. Denies calf pain, chest pain, or shortness of breath. Patient states he takes his aspirin occasionally. He is currently out of aspirin, ibuprofen, and tylenol. Patient is not participating in therapy. He is seen walking out of the office today using one crutch and being almost 100% weightbearing to his right lower extremity with his boot in place. He is asking to RTW doing scott or siding. Review of Systems -  All pertinent positives/negative in HPI     Objective:    General: Alert and oriented X 3, normocephalic atraumatic, external ears and eye normal, sclera clear, no acute distress, respirations easy and unlabored with no audible wheezes, skin warm and dry, speech and dress appropriate for noted age, affect euthymic.     Extremity:  Right Lower Extremity  Skin clean dry and

## 2023-08-10 NOTE — PATIENT INSTRUCTIONS
Start progressive weightbearing on the on right lower extremity in Boot. Start with 25% of body weight for 4-5 days and if tolerating well, no significant increased pain or swelling ok to progress to 50% of body weight in Boot for 4-5 days. Then to 75% of body weight for 4-5 days, then to 100% on on right lower extremity in the Boot. Once full weight bearing in the Boot for 1-2 weeks and tolerating well OK to start weaning out of Boot. Aspirin twice daily until full weightbearing. Tylenol and ibuprofen sent to pharmacy. Follow up in 4-6 weeks with repeat xrays.

## 2023-09-06 ENCOUNTER — TELEPHONE (OUTPATIENT)
Dept: ORTHOPEDIC SURGERY | Age: 38
End: 2023-09-06

## 2023-09-06 DIAGNOSIS — S93.324D LISFRANC DISLOCATION, RIGHT, SUBSEQUENT ENCOUNTER: Primary | ICD-10-CM

## 2023-09-06 NOTE — TELEPHONE ENCOUNTER
Patient called office requesting new order for physical therapy. He has not heard from [facility] ([facility] PT to schedule. Call returned to patient. Requesting [facility]. Order pended and routed.

## 2023-09-07 ENCOUNTER — TELEPHONE (OUTPATIENT)
Dept: ORTHOPEDIC SURGERY | Age: 38
End: 2023-09-07

## 2023-09-26 RX ORDER — IBUPROFEN 800 MG/1
800 TABLET ORAL EVERY 8 HOURS PRN
Qty: 60 TABLET | Refills: 5 | Status: SHIPPED | OUTPATIENT
Start: 2023-09-26

## 2023-09-26 RX ORDER — ACETAMINOPHEN 500 MG
500 TABLET ORAL 4 TIMES DAILY PRN
Qty: 360 TABLET | Refills: 1 | Status: SHIPPED | OUTPATIENT
Start: 2023-09-26

## 2023-09-26 NOTE — TELEPHONE ENCOUNTER
Received call from patient requesting refill of Ibuprofen 800 mg and Tylenol 500 mg  at [facility] in [facility]. OP:SURGEON: Dr. Carmelo Willams DO  DATE OF PROCEDURE: 5/10/23  PROCEDURE:  Right foot open reduction second tarsometatarsal/lisfranc joint with screw fixation. 2.  Right foot closed reduction with percutaneous screw fixation first TMT joint. 3.  Right foot closed reduction with manipulation of third fourth and fifth tarsometatarsal joint dislocations  4. Right foot closed treatment of medial, middle lateral cuneiform fractures. 5.  Right foot closed treatment base of second metatarsal fracture. Weeks from Surgery: 4.5 months    Last OV: 8/10/2023    Medication pended and routed to providers for decision and signature.     Future Appointments   Date Time Provider [address]   9/28/2023  8:30 AM Opal Ag DO  [facility]       Electronically signed by Aubrey Yoo ATC on 9/26/2023 at 1:00 PM

## 2023-09-28 ENCOUNTER — HOSPITAL ENCOUNTER (OUTPATIENT)
Dept: GENERAL RADIOLOGY | Age: 38
Discharge: HOME | End: 2023-09-30
Payer: COMMERCIAL

## 2023-09-28 ENCOUNTER — OFFICE VISIT (OUTPATIENT)
Dept: ORTHOPEDIC SURGERY | Age: 38
End: 2023-09-28
Payer: COMMERCIAL

## 2023-09-28 VITALS — WEIGHT: 215 LBS | BODY MASS INDEX: 29.12 KG/M2 | HEIGHT: 72 IN

## 2023-09-28 DIAGNOSIS — S93.324D LISFRANC DISLOCATION, RIGHT, SUBSEQUENT ENCOUNTER: ICD-10-CM

## 2023-09-28 DIAGNOSIS — S93.324D LISFRANC DISLOCATION, RIGHT, SUBSEQUENT ENCOUNTER: Primary | ICD-10-CM

## 2023-09-28 PROCEDURE — 73630 X-RAY EXAM OF FOOT: CPT

## 2023-09-28 PROCEDURE — 4004F PT TOBACCO SCREEN RCVD TLK: CPT | Performed by: PHYSICIAN ASSISTANT

## 2023-09-28 PROCEDURE — G8427 DOCREV CUR MEDS BY ELIG CLIN: HCPCS | Performed by: PHYSICIAN ASSISTANT

## 2023-09-28 PROCEDURE — 99213 OFFICE O/P EST LOW 20 MIN: CPT | Performed by: PHYSICIAN ASSISTANT

## 2023-09-28 PROCEDURE — G8419 CALC BMI OUT NRM PARAM NOF/U: HCPCS | Performed by: PHYSICIAN ASSISTANT

## 2023-09-28 NOTE — PROGRESS NOTES
using computerize voiced recognition software. Every effort has been made to ensure accuracy; however, inadvertent computerized transcription errors may be present.

## 2023-09-28 NOTE — PATIENT INSTRUCTIONS
Okay to wean from boot. Advance activities as tolerated. Continue therapy. Okay to RTW light duty. May return to work on 10/9/23 with the following restrictions: lifting/carrying not to exceed 50 lbs. , pushing/pulling not to exceed 50 lbs. , no ladders, avoid excessive walking or standing .

## 2024-03-06 ENCOUNTER — TELEPHONE (OUTPATIENT)
Dept: ORTHOPEDIC SURGERY | Age: 39
End: 2024-03-06

## 2024-03-06 NOTE — TELEPHONE ENCOUNTER
Pt called and LVM requesting refill of Tylenol and Motrin. RTC to pt and LVM informing pt that office will no longer be covering prescriber for his medication request since he has not been seen in office since September 2023. Informed pt to obtain medication OTC or f/u with PCP. Encouraged to call with questions or concerns.

## 2025-03-20 ENCOUNTER — HOSPITAL ENCOUNTER (EMERGENCY)
Age: 40
Discharge: HOME | End: 2025-03-20
Attending: EMERGENCY MEDICINE
Payer: COMMERCIAL

## 2025-03-20 VITALS
TEMPERATURE: 97.7 F | HEART RATE: 77 BPM | BODY MASS INDEX: 29.12 KG/M2 | WEIGHT: 215 LBS | HEIGHT: 72 IN | SYSTOLIC BLOOD PRESSURE: 129 MMHG | OXYGEN SATURATION: 97 % | DIASTOLIC BLOOD PRESSURE: 60 MMHG | RESPIRATION RATE: 18 BRPM

## 2025-03-20 DIAGNOSIS — M79.642 BILATERAL HAND PAIN: Primary | ICD-10-CM

## 2025-03-20 DIAGNOSIS — M79.641 BILATERAL HAND PAIN: Primary | ICD-10-CM

## 2025-03-20 PROCEDURE — 99283 EMERGENCY DEPT VISIT LOW MDM: CPT

## 2025-03-20 RX ORDER — PREDNISONE 20 MG/1
40 TABLET ORAL DAILY
Qty: 10 TABLET | Refills: 0 | Status: SHIPPED | OUTPATIENT
Start: 2025-03-20 | End: 2025-03-20

## 2025-03-20 RX ORDER — PREDNISONE 20 MG/1
40 TABLET ORAL DAILY
Qty: 10 TABLET | Refills: 0 | Status: SHIPPED | OUTPATIENT
Start: 2025-03-20 | End: 2025-03-25

## 2025-03-20 ASSESSMENT — LIFESTYLE VARIABLES
HOW MANY STANDARD DRINKS CONTAINING ALCOHOL DO YOU HAVE ON A TYPICAL DAY: PATIENT DOES NOT DRINK
HOW OFTEN DO YOU HAVE A DRINK CONTAINING ALCOHOL: NEVER

## 2025-03-20 NOTE — ED PROVIDER NOTES
Independent RAJIV Visit.  Performed with [facility] EMERGENCY DEPARTMENT  ED  Encounter Note  Admit Date/RoomTime: 3/20/2025 10:41 AM  ED Room: IGNACIO/IGNACIO  NAME: Jose Zamudio  : 1985  MRN: [id number]  PCP: No primary care provider on file.    CHIEF COMPLAINT     Numbness (Armando hands )    HISTORY OF PRESENT ILLNESS        Jose Zamudio is a 40 y.o. male, with a past medical hx of  has a past medical history of Gout and Mass on back. who presents to the ED by private vehicle for evaluation of bilateral hand pain and tingling, beginning 3 days ago. The complaint has been intermittent and are moderate in severity.  Patient states that for the last 3 days he has had intermittent bilateral hand pain and tingling to most of his fingers but more so in his first 3 fingers.  States that it specifically worse when he tries to hold a pencil or answer phone calls.  States that as his occupation he works as a .  Has recently been using a jackhammer where he receives a lot of vibration to both of his hands.  He denies any blunt trauma or injuries to his hands.  Has not taken any medications for his symptoms.  He denies any loss of sensation, headache, vision changes, unilateral weakness, chest pain, shortness of breath, fevers, chills, body aches, neck pain, back pain, or having any other associated symptoms.    REVIEW OF SYSTEMS     Pertinent positives and negatives are stated within HPI, all other systems reviewed and are negative.    Past Medical History:  has a past medical history of Gout and Mass on back.  Surgical History:  has a past surgical history that includes back surgery (N/A, 3/18/2022) and Foot surgery (Right, 5/10/2023).  Social History:  reports that he has been smoking cigarettes. He has a 14 pack-year smoking history. He has never used smokeless tobacco. He reports that he does not drink alcohol and does not use drugs.  Family History:

## 2025-03-20 NOTE — DISCHARGE INSTRUCTIONS
Thank you for attending [facility] Emergency Department.  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  I suspect this is likely compression of your nerves such as your median nerve.  Please follow-up with Dr. Granda to establish a primary care physician. You were also provided a referral to Dr. Olmos, orthopedic surgery for further evaluation of your symptoms and findings.  You were given a prescription of prednisone, please take these as prescribed.  Not take ibuprofen with prednisone.  You may take Tylenol.  I would also recommend a cock-up splint for your hands. If for what ever reason you develop worsening symptoms, such as worsening pain or complete loss of sensation of your hands, please do not hesitate to return to the emergency department immediately for further evaluation.

## 2025-03-20 NOTE — DISCHARGE INSTR - COC
Continuity of Care Form    Patient Name: Jose Zamudio   :  1985  MRN:  [id number]    Admit date:  3/20/2025  Discharge date:  ***    Code Status Order: Prior   Advance Directives:     Admitting Physician:  No admitting provider for patient encounter.  PCP: No primary care provider on file.    Discharging Nurse: ***  Discharging Hospital Unit/Room#: IGNACIO/IGNACIO  Discharging Unit Phone Number: ***    Emergency Contact:   Extended Emergency Contact Information  Primary Emergency Contact: Margot Hicks  Mobile Phone: [phone]  Relation: Girlfriend  Preferred language: English   needed? No    Past Surgical History:  Past Surgical History:   Procedure Laterality Date    BACK SURGERY N/A 3/18/2022    EXCISION UPPER BACK MASS performed by Esteban Salas MD at [facility] OR    FOOT SURGERY Right 5/10/2023    CLOSED REDUCTION PERCUTANEOUS SCREW FIXATION OF RIGHT FOOT LISFRANC INJURY performed by Marcos Quinteros DO at [facility] OR       Immunization History:   Immunization History   Administered Date(s) Administered    TD 2LF, TDVAX, (age 7y+), IM, 0.5mL 2023       Active Problems:  Patient Active Problem List   Diagnosis Code    Lisfranc dislocation, right, initial encounter S93.324A       Isolation/Infection:   Isolation            No Isolation          Patient Infection Status    None to display         Nurse Assessment:  Last Vital Signs: /60   Pulse 77   Temp 97.7 °F (36.5 °C)   Resp 18   Ht 1.829 m (6')   Wt 97.5 kg (215 lb)   SpO2 97%   BMI 29.16 kg/m²     Last documented pain score (0-10 scale):    Last Weight:   Wt Readings from Last 1 Encounters:   25 97.5 kg (215 lb)     Mental Status:  {IP PT MENTAL STATUS:}    IV Access:  { KENIA IV ACCESS:853763767}    Nursing Mobility/ADLs:  Walking   {CHP DME ADLs:137861519}  Transfer  {CHP DME ADLs:529892989}  Bathing  {CHP DME ADLs:963075918}  Dressing  {CHP DME ADLs:013076703}  Toileting  {CHP DME ADLs:157268949}  Feeding  {CHP  Schedule:  Phone:  Fax:    / signature: {Esignature:897558316}    PHYSICIAN SECTION    Prognosis: {Prognosis:1870159443}    Condition at Discharge: { Patient Condition:546677497}    Rehab Potential (if transferring to Rehab): {Prognosis:6863485947}    Recommended Labs or Other Treatments After Discharge: ***    Physician Certification: I certify the above information and transfer of Jose Zamudio  is necessary for the continuing treatment of the diagnosis listed and that he requires {Admit to Appropriate Level of Care:22432} for {GREATER/LESS:001572584} 30 days.     Update Admission H&P: {CHP DME Changes in HandP:944880171}    PHYSICIAN SIGNATURE:  {Esignature:062999829}

## (undated) DEVICE — BLADE CLIPPER GEN PURP NS

## (undated) DEVICE — GOWN,SIRUS,FABRNF,XL,20/CS: Brand: MEDLINE

## (undated) DEVICE — GLOVE ORTHO 8   MSG9480

## (undated) DEVICE — GLOVE ORANGE PI 8   MSG9080

## (undated) DEVICE — ELECTRODE PT RET AD L9FT HI MOIST COND ADH HYDRGEL CORDED

## (undated) DEVICE — SPONGE,LAP,4"X18",XR,ST,5/PK,40PK/CS: Brand: MEDLINE INDUSTRIES, INC.

## (undated) DEVICE — DRAPE C ARM W41XL74IN UNIV MOB W RUBBERBAND CLP

## (undated) DEVICE — PACK PROCEDURE SURG GEN CUST

## (undated) DEVICE — C-ARMOR C-ARM EQUIPMENT COVERS CLEAR STERILE UNIVERSAL FIT 12 PER CASE: Brand: C-ARMOR

## (undated) DEVICE — SOLUTION IV IRRIG POUR BRL 0.9% SODIUM CHL 2F7124

## (undated) DEVICE — LOWER EXT KNEE DRAPE: Brand: MEDLINE INDUSTRIES, INC.

## (undated) DEVICE — NEEDLE HYPO 25GA L1.5IN BLU POLYPR HUB S STL REG BVL STR

## (undated) DEVICE — DRAPE EQUIP CARM 72X42 IN RUBBER BND CLP

## (undated) DEVICE — DRAPE,REIN 53X77,STERILE: Brand: MEDLINE

## (undated) DEVICE — BIT DRL L160MM DIA2.7MM ST CANN QUIK CPL NONRADIOLUCENT ADJ

## (undated) DEVICE — PAD DSG NEG PRSS W/ TBNG CLMP CONN SENSATRAC VAC

## (undated) DEVICE — ZIMMER® STERILE DISPOSABLE TOURNIQUET CUFF WITH PROTECTIVE SLEEVE AND PLC, DUAL PORT, SINGLE BLADDER, 34 IN. (86 CM)

## (undated) DEVICE — 4-PORT MANIFOLD: Brand: NEPTUNE 2

## (undated) DEVICE — Device

## (undated) DEVICE — DRESSING,GAUZE,XEROFORM,CURAD,1"X8",ST: Brand: CURAD

## (undated) DEVICE — APPLICATOR PREP 26ML 0.7% IOD POVACRYLEX 74% ISO ALC ST

## (undated) DEVICE — DOUBLE BASIN SET: Brand: MEDLINE INDUSTRIES, INC.

## (undated) DEVICE — WEITLANER RETRCT 6.5 3X4 PRNG BLNT

## (undated) DEVICE — PADDING,UNDERCAST,COTTON, 4"X4YD STERILE: Brand: MEDLINE

## (undated) DEVICE — TUBING SUCT 12FR MAL ALUM SHFT FN CAP VENT UNIV CONN W/ OBT

## (undated) DEVICE — DRAPE,LAPAROTOMY,PCH,STERILE: Brand: MEDLINE

## (undated) DEVICE — TOWEL,OR,DSP,ST,BLUE,STD,6/PK,12PK/CS: Brand: MEDLINE

## (undated) DEVICE — SCREW BNE L34MM DIA4MM S STL CANN SHT 1/3 THRD SM HEX SOCK
Type: IMPLANTABLE DEVICE | Site: FOOT | Status: NON-FUNCTIONAL
Removed: 2023-05-10

## (undated) DEVICE — GUIDEWIRE ORTH L150MM DIA1.25MM S STL NTHRD FOR 4MM CANN

## (undated) DEVICE — PADDING CAST W6INXL4YD COT LO LINTING WYTEX

## (undated) DEVICE — BLADE ES ELASTOMERIC COAT INSUL DURABLE BEND UPTO 90DEG

## (undated) DEVICE — CONTROL SYRINGE LUER-LOCK TIP: Brand: MONOJECT